# Patient Record
Sex: FEMALE | Race: WHITE | NOT HISPANIC OR LATINO | URBAN - METROPOLITAN AREA
[De-identification: names, ages, dates, MRNs, and addresses within clinical notes are randomized per-mention and may not be internally consistent; named-entity substitution may affect disease eponyms.]

---

## 2017-02-25 ENCOUNTER — EMERGENCY (EMERGENCY)
Facility: HOSPITAL | Age: 82
LOS: 1 days | Discharge: HOME | End: 2017-02-25
Attending: EMERGENCY MEDICINE | Admitting: EMERGENCY MEDICINE
Payer: MEDICARE

## 2017-02-25 VITALS
RESPIRATION RATE: 16 BRPM | DIASTOLIC BLOOD PRESSURE: 68 MMHG | TEMPERATURE: 98 F | HEART RATE: 73 BPM | SYSTOLIC BLOOD PRESSURE: 159 MMHG | OXYGEN SATURATION: 95 %

## 2017-02-25 VITALS
OXYGEN SATURATION: 98 % | DIASTOLIC BLOOD PRESSURE: 61 MMHG | RESPIRATION RATE: 16 BRPM | HEART RATE: 80 BPM | SYSTOLIC BLOOD PRESSURE: 166 MMHG

## 2017-02-25 DIAGNOSIS — Z85.828 PERSONAL HISTORY OF OTHER MALIGNANT NEOPLASM OF SKIN: Chronic | ICD-10-CM

## 2017-02-25 DIAGNOSIS — Z98.89 OTHER SPECIFIED POSTPROCEDURAL STATES: Chronic | ICD-10-CM

## 2017-02-25 LAB
ALBUMIN SERPL ELPH-MCNC: 4.3 G/DL — SIGNIFICANT CHANGE UP (ref 3.3–5)
ALP SERPL-CCNC: 103 U/L — SIGNIFICANT CHANGE UP (ref 40–120)
ALT FLD-CCNC: 16 U/L — SIGNIFICANT CHANGE UP (ref 4–33)
ANISOCYTOSIS BLD QL: SLIGHT — SIGNIFICANT CHANGE UP
APPEARANCE UR: CLEAR — SIGNIFICANT CHANGE UP
AST SERPL-CCNC: 19 U/L — SIGNIFICANT CHANGE UP (ref 4–32)
BASOPHILS # BLD AUTO: 0.01 K/UL — SIGNIFICANT CHANGE UP (ref 0–0.2)
BASOPHILS NFR BLD AUTO: 0.1 % — SIGNIFICANT CHANGE UP (ref 0–2)
BASOPHILS NFR SPEC: 0 % — SIGNIFICANT CHANGE UP (ref 0–2)
BILIRUB SERPL-MCNC: 0.4 MG/DL — SIGNIFICANT CHANGE UP (ref 0.2–1.2)
BILIRUB UR-MCNC: NEGATIVE — SIGNIFICANT CHANGE UP
BLASTS # FLD: 0 % — SIGNIFICANT CHANGE UP (ref 0–0)
BLOOD UR QL VISUAL: NEGATIVE — SIGNIFICANT CHANGE UP
BUN SERPL-MCNC: 14 MG/DL — SIGNIFICANT CHANGE UP (ref 7–23)
CALCIUM SERPL-MCNC: 9.6 MG/DL — SIGNIFICANT CHANGE UP (ref 8.4–10.5)
CHLORIDE SERPL-SCNC: 103 MMOL/L — SIGNIFICANT CHANGE UP (ref 98–107)
CO2 SERPL-SCNC: 27 MMOL/L — SIGNIFICANT CHANGE UP (ref 22–31)
COLOR SPEC: SIGNIFICANT CHANGE UP
CREAT SERPL-MCNC: 0.68 MG/DL — SIGNIFICANT CHANGE UP (ref 0.5–1.3)
EOSINOPHIL # BLD AUTO: 0.08 K/UL — SIGNIFICANT CHANGE UP (ref 0–0.5)
EOSINOPHIL NFR BLD AUTO: 0.9 % — SIGNIFICANT CHANGE UP (ref 0–6)
EOSINOPHIL NFR FLD: 1.8 % — SIGNIFICANT CHANGE UP (ref 0–6)
GLUCOSE SERPL-MCNC: 134 MG/DL — HIGH (ref 70–99)
GLUCOSE UR-MCNC: NEGATIVE — SIGNIFICANT CHANGE UP
HCT VFR BLD CALC: 41.4 % — SIGNIFICANT CHANGE UP (ref 34.5–45)
HGB BLD-MCNC: 13 G/DL — SIGNIFICANT CHANGE UP (ref 11.5–15.5)
IMM GRANULOCYTES NFR BLD AUTO: 0.4 % — SIGNIFICANT CHANGE UP (ref 0–1.5)
KETONES UR-MCNC: NEGATIVE — SIGNIFICANT CHANGE UP
LEUKOCYTE ESTERASE UR-ACNC: HIGH
LYMPHOCYTES # BLD AUTO: 1.1 K/UL — SIGNIFICANT CHANGE UP (ref 1–3.3)
LYMPHOCYTES # BLD AUTO: 12.4 % — LOW (ref 13–44)
LYMPHOCYTES NFR SPEC AUTO: 7.9 % — LOW (ref 13–44)
MCHC RBC-ENTMCNC: 26.5 PG — LOW (ref 27–34)
MCHC RBC-ENTMCNC: 31.4 % — LOW (ref 32–36)
MCV RBC AUTO: 84.5 FL — SIGNIFICANT CHANGE UP (ref 80–100)
METAMYELOCYTES # FLD: 0 % — SIGNIFICANT CHANGE UP (ref 0–1)
MONOCYTES # BLD AUTO: 0.68 K/UL — SIGNIFICANT CHANGE UP (ref 0–0.9)
MONOCYTES NFR BLD AUTO: 7.6 % — SIGNIFICANT CHANGE UP (ref 2–14)
MONOCYTES NFR BLD: 7.9 % — SIGNIFICANT CHANGE UP (ref 2–9)
MUCOUS THREADS # UR AUTO: SIGNIFICANT CHANGE UP
MYELOCYTES NFR BLD: 0 % — SIGNIFICANT CHANGE UP (ref 0–0)
NEUTROPHIL AB SER-ACNC: 82.5 % — HIGH (ref 43–77)
NEUTROPHILS # BLD AUTO: 6.98 K/UL — SIGNIFICANT CHANGE UP (ref 1.8–7.4)
NEUTROPHILS NFR BLD AUTO: 78.6 % — HIGH (ref 43–77)
NEUTS BAND # BLD: 0 % — SIGNIFICANT CHANGE UP (ref 0–6)
NITRITE UR-MCNC: NEGATIVE — SIGNIFICANT CHANGE UP
OTHER - HEMATOLOGY %: 0 — SIGNIFICANT CHANGE UP
PH UR: 7 — SIGNIFICANT CHANGE UP (ref 4.6–8)
PLATELET # BLD AUTO: 59 K/UL — LOW (ref 150–400)
PLATELET COUNT - ESTIMATE: SIGNIFICANT CHANGE UP
PMV BLD: 11.4 FL — SIGNIFICANT CHANGE UP (ref 7–13)
POIKILOCYTOSIS BLD QL AUTO: SLIGHT — SIGNIFICANT CHANGE UP
POTASSIUM SERPL-MCNC: 4.6 MMOL/L — SIGNIFICANT CHANGE UP (ref 3.5–5.3)
POTASSIUM SERPL-SCNC: 4.6 MMOL/L — SIGNIFICANT CHANGE UP (ref 3.5–5.3)
PROMYELOCYTES # FLD: 0 % — SIGNIFICANT CHANGE UP (ref 0–0)
PROT SERPL-MCNC: 7.2 G/DL — SIGNIFICANT CHANGE UP (ref 6–8.3)
PROT UR-MCNC: NEGATIVE — SIGNIFICANT CHANGE UP
RBC # BLD: 4.9 M/UL — SIGNIFICANT CHANGE UP (ref 3.8–5.2)
RBC # FLD: 15.4 % — HIGH (ref 10.3–14.5)
RBC CASTS # UR COMP ASSIST: SIGNIFICANT CHANGE UP (ref 0–?)
SODIUM SERPL-SCNC: 144 MMOL/L — SIGNIFICANT CHANGE UP (ref 135–145)
SP GR SPEC: 1.01 — SIGNIFICANT CHANGE UP (ref 1–1.03)
UROBILINOGEN FLD QL: NORMAL E.U. — SIGNIFICANT CHANGE UP (ref 0.1–0.2)
VARIANT LYMPHS # BLD: 0 % — SIGNIFICANT CHANGE UP
WBC # BLD: 8.89 K/UL — SIGNIFICANT CHANGE UP (ref 3.8–10.5)
WBC # FLD AUTO: 8.89 K/UL — SIGNIFICANT CHANGE UP (ref 3.8–10.5)
WBC UR QL: HIGH (ref 0–?)

## 2017-02-25 PROCEDURE — 73100 X-RAY EXAM OF WRIST: CPT | Mod: 26,LT

## 2017-02-25 PROCEDURE — 70450 CT HEAD/BRAIN W/O DYE: CPT | Mod: 26

## 2017-02-25 PROCEDURE — 99284 EMERGENCY DEPT VISIT MOD MDM: CPT | Mod: GC

## 2017-02-25 PROCEDURE — 73562 X-RAY EXAM OF KNEE 3: CPT | Mod: 26,50

## 2017-02-25 PROCEDURE — 71020: CPT | Mod: 26

## 2017-02-25 RX ORDER — ACETAMINOPHEN 500 MG
650 TABLET ORAL ONCE
Qty: 0 | Refills: 0 | Status: COMPLETED | OUTPATIENT
Start: 2017-02-25 | End: 2017-02-25

## 2017-02-25 RX ORDER — TETANUS TOXOID, REDUCED DIPHTHERIA TOXOID AND ACELLULAR PERTUSSIS VACCINE, ADSORBED 5; 2.5; 8; 8; 2.5 [IU]/.5ML; [IU]/.5ML; UG/.5ML; UG/.5ML; UG/.5ML
0.5 SUSPENSION INTRAMUSCULAR ONCE
Qty: 0 | Refills: 0 | Status: COMPLETED | OUTPATIENT
Start: 2017-02-25 | End: 2017-02-25

## 2017-02-25 RX ADMIN — TETANUS TOXOID, REDUCED DIPHTHERIA TOXOID AND ACELLULAR PERTUSSIS VACCINE, ADSORBED 0.5 MILLILITER(S): 5; 2.5; 8; 8; 2.5 SUSPENSION INTRAMUSCULAR at 17:33

## 2017-02-25 RX ADMIN — Medication 1 TABLET(S): at 19:27

## 2017-02-25 RX ADMIN — Medication 650 MILLIGRAM(S): at 18:30

## 2017-02-25 NOTE — ED PROVIDER NOTE - ATTENDING CONTRIBUTION TO CARE
Dr. Sanderson: I have personally seen and examined this patient at the bedside. I have fully participated in the care of this patient. I have reviewed all pertinent clinical information, including history, physical exam, plan and the Resident's note and agree except as noted. HPI above as by me. PE above as by me. Mechanical slip and fall stable vitals DDX traumatic subdural, rib fx, wrist injury PLAN tetanus, ct head, ct neck, cxr rib series, lt wrist and knee films, pain control. Discussed with patient admit of ct pos ich or multiple rib fractures, otherwise patient is well appearing, active and highly independent, in the absence of bleeding or multiple rib fractures patient could be dc home with pain control.

## 2017-02-25 NOTE — ED PROVIDER NOTE - OBJECTIVE STATEMENT
87yo F PMHx HTN, DM p/w CC rib pain, left wrist pain s/p fall - pt. explains she was walking down concrete stairs at her house when she slipped and fell, she hit left side of her head, denies LOC, not on blood thinners, remembers entire episode and was able to ambulate after fall - she is currently complaining of left wrist pain, B/L knee pain and diffuse B/L rib pain. Denies HA, visual changes, CP, SOB, LOC, N/V/D. 89yo F PMHx HTN, DM p/w CC rib pain, left wrist pain s/p fall - pt. explains she was walking down concrete stairs at her house when she slipped and fell, she hit left side of her head, denies LOC, not on blood thinners, remembers entire episode and was able to ambulate after fall - she is currently complaining of left wrist pain, B/L knee pain and diffuse B/L rib pain. Denies HA, visual changes, CP, SOB, LOC, N/V/D.    Kin att: Active 88yoF hx htn, dm s/p slip and fall c/o lue and left rib pain. 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 Denies loc, blood thinners, ha, blurry vision. Denies cp, palpitaitons, or sob before or after fall. Ambulatory since fall at home and in ED. At present reports pain to left forehead, left wrist, and left ribs. Pain in ribs worse with deep inspiration and direct palpation.

## 2017-02-25 NOTE — ED ADULT NURSE NOTE - OBJECTIVE STATEMENT
Received pt. in rm 11, A&Ox3, c/o trip and fall earlier today down the stairs. Pt. states she fell forwards; c/o b/l knee pain (abrasion to the left knee), left wrist and b/l ribs underneath breasts. Laceration to the left eyebrow. Denies LOC, dizziness, HA, n/v, cp or sob. h/o falls in the past. Ambulates at baseline without assistance. MD at bedside for eval. Will continue to monitor.

## 2017-02-25 NOTE — ED PROVIDER NOTE - MEDICAL DECISION MAKING DETAILS
89yo F PMHx HTN DM p/w CC knee, wrist, rib pain s/p fall will send for xray b/l knee, L wrist, CXR, CT head, basic labs, ua and reevaluate

## 2017-02-25 NOTE — ED ADULT TRIAGE NOTE - CHIEF COMPLAINT QUOTE
Hx of recent falls. Pt s/p trip and fall down 4-5 stairs c/o left sided rib and left wrist pain. Small lac noted to left eyebrow. pt denies blood thinners, LOC, chest pain, SOB.

## 2017-02-25 NOTE — ED PROVIDER NOTE - PHYSICAL EXAMINATION
Sanderson att: gcs 15, nad, aaox3, perrl, eomi, pos left frontal hematoma 1cm x 1cm with 5mm linear laceration, c-spine non tender, neck supple, neg spinal tenderness, ctabl, rrr, s1s2, abd soft ntnd, pos left ribs tender, lt wrist tender, lt knee tender, ambulatory neg assistance.

## 2017-02-25 NOTE — ED PROVIDER NOTE - FAMILY HISTORY
Mother  Still living? No  Family history of colon cancer, Age at diagnosis: Age Unknown     Father  Still living? No  Family history of lung cancer, Age at diagnosis: Age Unknown

## 2017-02-25 NOTE — ED PROVIDER NOTE - PROGRESS NOTE DETAILS
SALOME EP: 89 y/o female with hx of HTN and DM here after fall. PAtient reports a mechanical fall and landing on her L side on her chest, and knee and LUE. Patient denies LOC, HA, vomiting, visual changes, ataxia, recent fever, recent abd pain, recent N/V/D, recent urinary symptoms, or being on anti-coagulation. Consider mechanical fall and will eval for UTI. Plan Xray of L hand, CT head, Adacel, Pain control and UA and reassess

## 2018-05-18 NOTE — ED ADULT NURSE NOTE - FINAL NURSING ELECTRONIC SIGNATURE
STOP THE BLOOD PRESSURE MEDICATION ON Tuesday AND PT IS STATING THAT HE'S BREATHING IS  BETTER WITH OUT IT   SO PLEASE SEND NEW RX TO PHARMACY [facility] [place] 25-Feb-2017 19:42

## 2018-06-25 ENCOUNTER — LABORATORY RESULT (OUTPATIENT)
Age: 83
End: 2018-06-25

## 2018-06-25 ENCOUNTER — APPOINTMENT (OUTPATIENT)
Dept: HEMATOLOGY ONCOLOGY | Facility: CLINIC | Age: 83
End: 2018-06-25

## 2018-06-25 VITALS
HEIGHT: 61 IN | SYSTOLIC BLOOD PRESSURE: 153 MMHG | HEART RATE: 87 BPM | WEIGHT: 129 LBS | TEMPERATURE: 97.6 F | DIASTOLIC BLOOD PRESSURE: 74 MMHG | BODY MASS INDEX: 24.35 KG/M2

## 2018-06-25 DIAGNOSIS — Z87.19 PERSONAL HISTORY OF OTHER DISEASES OF THE DIGESTIVE SYSTEM: ICD-10-CM

## 2018-06-25 DIAGNOSIS — E11.9 TYPE 2 DIABETES MELLITUS W/OUT COMPLICATIONS: ICD-10-CM

## 2018-06-25 DIAGNOSIS — Z78.9 OTHER SPECIFIED HEALTH STATUS: ICD-10-CM

## 2018-06-25 LAB
HCT VFR BLD CALC: 31.1 %
HGB BLD-MCNC: 9.4 G/DL
IRON SATN MFR SERPL: 11 %
IRON SERPL-MCNC: 34 UG/DL
MCHC RBC-ENTMCNC: 26.3 PG
MCHC RBC-ENTMCNC: 30.2 G/DL
MCV RBC AUTO: 86.9 FL
PLATELET # BLD AUTO: 208 K/UL
PMV BLD: 10.8 FL
RBC # BLD: 3.58 M/UL
RBC # FLD: 17.2 %
RETICS # AUTO: 2.4 %
RETICS AGGREG/RBC NFR: 84.8 K/UL
TIBC SERPL-MCNC: 312 UG/DL
UIBC SERPL-MCNC: 278 UG/DL
WBC # FLD AUTO: 7.6 K/UL

## 2018-06-27 LAB
DEPRECATED KAPPA LC FREE/LAMBDA SER: 0.54 RATIO
FERRITIN SERPL-MCNC: 12 NG/ML
KAPPA LC CSF-MCNC: 2.07 MG/DL
KAPPA LC SERPL-MCNC: 1.11 MG/DL
TSH SERPL-ACNC: 4.37 UIU/ML
VIT B12 SERPL-MCNC: 282 PG/ML

## 2018-06-29 ENCOUNTER — APPOINTMENT (OUTPATIENT)
Dept: INFUSION THERAPY | Facility: CLINIC | Age: 83
End: 2018-06-29

## 2018-06-29 RX ORDER — IRON SUCROSE 20 MG/ML
200 INJECTION, SOLUTION INTRAVENOUS ONCE
Qty: 0 | Refills: 0 | Status: COMPLETED | OUTPATIENT
Start: 2018-06-29 | End: 2018-06-29

## 2018-06-29 RX ORDER — DIPHENHYDRAMINE HCL 50 MG
25 CAPSULE ORAL ONCE
Qty: 0 | Refills: 0 | Status: COMPLETED | OUTPATIENT
Start: 2018-06-29 | End: 2018-06-29

## 2018-06-29 RX ADMIN — Medication 25 MILLIGRAM(S): at 16:34

## 2018-06-29 RX ADMIN — IRON SUCROSE 220 MILLIGRAM(S): 20 INJECTION, SOLUTION INTRAVENOUS at 15:05

## 2018-07-06 ENCOUNTER — APPOINTMENT (OUTPATIENT)
Dept: INFUSION THERAPY | Facility: CLINIC | Age: 83
End: 2018-07-06

## 2018-07-06 RX ORDER — DIPHENHYDRAMINE HCL 50 MG
25 CAPSULE ORAL ONCE
Qty: 0 | Refills: 0 | Status: COMPLETED | OUTPATIENT
Start: 2018-07-06 | End: 2018-07-06

## 2018-07-06 RX ORDER — IRON SUCROSE 20 MG/ML
200 INJECTION, SOLUTION INTRAVENOUS ONCE
Qty: 0 | Refills: 0 | Status: COMPLETED | OUTPATIENT
Start: 2018-07-06 | End: 2018-07-06

## 2018-07-06 RX ADMIN — IRON SUCROSE 220 MILLIGRAM(S): 20 INJECTION, SOLUTION INTRAVENOUS at 15:13

## 2018-07-06 RX ADMIN — Medication 151.5 MILLIGRAM(S): at 15:13

## 2018-07-13 ENCOUNTER — APPOINTMENT (OUTPATIENT)
Dept: INFUSION THERAPY | Facility: CLINIC | Age: 83
End: 2018-07-13

## 2018-07-13 VITALS
HEART RATE: 80 BPM | TEMPERATURE: 96.3 F | DIASTOLIC BLOOD PRESSURE: 62 MMHG | SYSTOLIC BLOOD PRESSURE: 144 MMHG | RESPIRATION RATE: 18 BRPM

## 2018-07-13 RX ORDER — IRON SUCROSE 20 MG/ML
200 INJECTION, SOLUTION INTRAVENOUS ONCE
Qty: 0 | Refills: 0 | Status: COMPLETED | OUTPATIENT
Start: 2018-07-13 | End: 2018-07-13

## 2018-07-13 RX ORDER — DIPHENHYDRAMINE HCL 50 MG
25 CAPSULE ORAL ONCE
Qty: 0 | Refills: 0 | Status: COMPLETED | OUTPATIENT
Start: 2018-07-13 | End: 2018-07-13

## 2018-07-13 RX ADMIN — Medication 151.5 MILLIGRAM(S): at 15:15

## 2018-07-13 RX ADMIN — IRON SUCROSE 220 MILLIGRAM(S): 20 INJECTION, SOLUTION INTRAVENOUS at 15:15

## 2018-07-20 ENCOUNTER — APPOINTMENT (OUTPATIENT)
Dept: INFUSION THERAPY | Facility: CLINIC | Age: 83
End: 2018-07-20

## 2018-07-20 RX ORDER — IRON SUCROSE 20 MG/ML
200 INJECTION, SOLUTION INTRAVENOUS ONCE
Qty: 0 | Refills: 0 | Status: COMPLETED | OUTPATIENT
Start: 2018-07-20 | End: 2018-07-20

## 2018-07-20 RX ORDER — DIPHENHYDRAMINE HCL 50 MG
25 CAPSULE ORAL ONCE
Qty: 0 | Refills: 0 | Status: COMPLETED | OUTPATIENT
Start: 2018-07-20 | End: 2018-07-20

## 2018-07-20 RX ADMIN — Medication 151.5 MILLIGRAM(S): at 14:48

## 2018-07-20 RX ADMIN — IRON SUCROSE 220 MILLIGRAM(S): 20 INJECTION, SOLUTION INTRAVENOUS at 14:48

## 2018-07-27 ENCOUNTER — APPOINTMENT (OUTPATIENT)
Dept: INFUSION THERAPY | Facility: CLINIC | Age: 83
End: 2018-07-27

## 2018-07-27 RX ORDER — DIPHENHYDRAMINE HCL 50 MG
25 CAPSULE ORAL ONCE
Qty: 0 | Refills: 0 | Status: COMPLETED | OUTPATIENT
Start: 2018-07-27 | End: 2018-07-27

## 2018-07-27 RX ORDER — IRON SUCROSE 20 MG/ML
200 INJECTION, SOLUTION INTRAVENOUS ONCE
Qty: 0 | Refills: 0 | Status: COMPLETED | OUTPATIENT
Start: 2018-07-27 | End: 2018-07-27

## 2018-07-27 RX ADMIN — IRON SUCROSE 220 MILLIGRAM(S): 20 INJECTION, SOLUTION INTRAVENOUS at 14:48

## 2018-07-27 RX ADMIN — Medication 151.5 MILLIGRAM(S): at 14:48

## 2018-08-03 ENCOUNTER — APPOINTMENT (OUTPATIENT)
Dept: HEMATOLOGY ONCOLOGY | Facility: CLINIC | Age: 83
End: 2018-08-03

## 2018-08-03 ENCOUNTER — LABORATORY RESULT (OUTPATIENT)
Age: 83
End: 2018-08-03

## 2018-08-06 LAB
FERRITIN SERPL-MCNC: 279 NG/ML
HCT VFR BLD CALC: 36.8 %
HGB BLD-MCNC: 11.6 G/DL
IRON SATN MFR SERPL: 25 %
IRON SERPL-MCNC: 71 UG/DL
MCHC RBC-ENTMCNC: 27.8 PG
MCHC RBC-ENTMCNC: 31.5 G/DL
MCV RBC AUTO: 88.2 FL
PLATELET # BLD AUTO: 152 K/UL
PMV BLD: 10.4 FL
RBC # BLD: 4.17 M/UL
RBC # FLD: 17.8 %
TIBC SERPL-MCNC: 286 UG/DL
UIBC SERPL-MCNC: 215 UG/DL
WBC # FLD AUTO: 7.51 K/UL

## 2018-08-16 ENCOUNTER — APPOINTMENT (OUTPATIENT)
Dept: HEMATOLOGY ONCOLOGY | Facility: CLINIC | Age: 83
End: 2018-08-16

## 2018-08-16 VITALS
HEART RATE: 79 BPM | HEIGHT: 61 IN | BODY MASS INDEX: 24.17 KG/M2 | RESPIRATION RATE: 18 BRPM | TEMPERATURE: 96 F | SYSTOLIC BLOOD PRESSURE: 144 MMHG | DIASTOLIC BLOOD PRESSURE: 80 MMHG | WEIGHT: 128 LBS

## 2018-09-17 ENCOUNTER — APPOINTMENT (OUTPATIENT)
Dept: HEMATOLOGY ONCOLOGY | Facility: CLINIC | Age: 83
End: 2018-09-17

## 2018-09-17 ENCOUNTER — OUTPATIENT (OUTPATIENT)
Dept: OUTPATIENT SERVICES | Facility: HOSPITAL | Age: 83
LOS: 1 days | Discharge: HOME | End: 2018-09-17

## 2018-09-17 VITALS
RESPIRATION RATE: 18 BRPM | HEIGHT: 61 IN | SYSTOLIC BLOOD PRESSURE: 122 MMHG | DIASTOLIC BLOOD PRESSURE: 76 MMHG | BODY MASS INDEX: 24.17 KG/M2 | WEIGHT: 128 LBS | HEART RATE: 90 BPM | TEMPERATURE: 97.1 F

## 2018-09-17 DIAGNOSIS — Z85.828 PERSONAL HISTORY OF OTHER MALIGNANT NEOPLASM OF SKIN: Chronic | ICD-10-CM

## 2018-09-17 DIAGNOSIS — M31.6 OTHER GIANT CELL ARTERITIS: ICD-10-CM

## 2018-09-17 DIAGNOSIS — D47.2 MONOCLONAL GAMMOPATHY: ICD-10-CM

## 2018-09-17 DIAGNOSIS — Z98.89 OTHER SPECIFIED POSTPROCEDURAL STATES: Chronic | ICD-10-CM

## 2018-09-18 DIAGNOSIS — D50.9 IRON DEFICIENCY ANEMIA, UNSPECIFIED: ICD-10-CM

## 2018-12-17 ENCOUNTER — APPOINTMENT (OUTPATIENT)
Dept: HEMATOLOGY ONCOLOGY | Facility: CLINIC | Age: 83
End: 2018-12-17

## 2018-12-17 ENCOUNTER — OUTPATIENT (OUTPATIENT)
Dept: OUTPATIENT SERVICES | Facility: HOSPITAL | Age: 83
LOS: 1 days | Discharge: HOME | End: 2018-12-17

## 2018-12-17 ENCOUNTER — LABORATORY RESULT (OUTPATIENT)
Age: 83
End: 2018-12-17

## 2018-12-17 VITALS
SYSTOLIC BLOOD PRESSURE: 165 MMHG | DIASTOLIC BLOOD PRESSURE: 80 MMHG | WEIGHT: 126 LBS | HEART RATE: 84 BPM | RESPIRATION RATE: 18 BRPM | BODY MASS INDEX: 23.79 KG/M2 | HEIGHT: 61 IN

## 2018-12-17 DIAGNOSIS — Z98.89 OTHER SPECIFIED POSTPROCEDURAL STATES: Chronic | ICD-10-CM

## 2018-12-17 DIAGNOSIS — Z00.00 ENCOUNTER FOR GENERAL ADULT MEDICAL EXAMINATION W/OUT ABNORMAL FINDINGS: ICD-10-CM

## 2018-12-17 DIAGNOSIS — Z85.828 PERSONAL HISTORY OF OTHER MALIGNANT NEOPLASM OF SKIN: Chronic | ICD-10-CM

## 2018-12-17 LAB
HCT VFR BLD CALC: 35.9 %
HGB BLD-MCNC: 11.7 G/DL
MCHC RBC-ENTMCNC: 28.1 PG
MCHC RBC-ENTMCNC: 32.6 G/DL
MCV RBC AUTO: 86.3 FL
PLATELET # BLD AUTO: 184 K/UL
PMV BLD: 10.7 FL
RBC # BLD: 4.16 M/UL
RBC # FLD: 14.9 %
WBC # FLD AUTO: 5.77 K/UL

## 2018-12-17 NOTE — ASSESSMENT
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 with history of melena on ASA , past history of bleeding ulcers ? Iron deficiency anemia responding to therapy , Hb is 11.7 and stable , will check ferritin . follow up in 3 months . \par 2) Nephrolithiasis. followed by urology . \par

## 2018-12-17 NOTE — HISTORY OF PRESENT ILLNESS
[de-identified] : 89 year old white female referred by Dr Mayo for anemia. PMH significant for GERD , GI bleeding ( probably bleeding ulcers? ),  she was in her usual state of health until 6 months ago when she started with complaints of muscle pain , stiffness, jaw claudication, She asked to see rheumatology after reading an article in local newspaper on polymyalgia rheumatica. she was found with markedly elevated CRP ( 57 ) , ESR : 96 Hb : 10.2 ,MCV : 80 , significant platelet clumping , positive CCP igG :94 , negative RF , faint M spike, Temporal artey biopsy was allegedly inconclusive and was started nonetheless on a course of prednisone with improvement in her symptoms and normalization of blood work including ESR , CCP , CRP , immunofixation ,  except for  Hb which was further decreased to 9.6 on May 29 . She complains of mild generalized weakness and noted melanotic stools for several days when she started on ASA . Since then she reduced to one every 3 days. She is currently on prednisone 15mg daily , PPI every 2 days. She is intolerant to po iron . She denies weight loss, visual symptoms. She is fairly active , recently won senior [employer] championship. 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  [de-identified] : 08/16/2018 : Patient returns for follow up , she feels better after receiving venofer for iron deficiency likely secondary to GI bleeding . she resumed aspirin and takes half baby aspirin every 3 to 4 days . She reports occasional melena. She notes mild pedal edema for which she was prescribed lasix . last blood work showed Hb : 11.6 , ferritin : 279 \par \par 12/17/2018 Patient returns for follow up for iron deficiency from suspected GI bleed . She feels well , she denies any fatigue or increased weakness from baseline , no craving for ice or hematochezia she continues on prednisone 7.5 mg for Temporal Arteritis. She was also diagnosed with left nephro-ureterolithiasis and recommended observation for now , she denies flank pain or hematuria . serum iron or ferritin was allegedly 9 two weeks ago .

## 2018-12-17 NOTE — PHYSICAL EXAM
[Normal] : normoactive bowel sounds, soft and nontender, no hepatosplenomegaly or masses appreciated [de-identified] : well preserved , pale in no acute distress.

## 2018-12-18 LAB — FERRITIN SERPL-MCNC: 73 NG/ML

## 2018-12-19 DIAGNOSIS — D50.9 IRON DEFICIENCY ANEMIA, UNSPECIFIED: ICD-10-CM

## 2019-03-25 ENCOUNTER — LABORATORY RESULT (OUTPATIENT)
Age: 84
End: 2019-03-25

## 2019-03-25 ENCOUNTER — APPOINTMENT (OUTPATIENT)
Dept: HEMATOLOGY ONCOLOGY | Facility: CLINIC | Age: 84
End: 2019-03-25

## 2019-03-25 ENCOUNTER — OUTPATIENT (OUTPATIENT)
Dept: OUTPATIENT SERVICES | Facility: HOSPITAL | Age: 84
LOS: 1 days | Discharge: HOME | End: 2019-03-25

## 2019-03-25 VITALS
BODY MASS INDEX: 25.11 KG/M2 | HEIGHT: 61 IN | DIASTOLIC BLOOD PRESSURE: 76 MMHG | WEIGHT: 133 LBS | HEART RATE: 83 BPM | SYSTOLIC BLOOD PRESSURE: 154 MMHG | RESPIRATION RATE: 14 BRPM | TEMPERATURE: 98.2 F

## 2019-03-25 DIAGNOSIS — Z98.89 OTHER SPECIFIED POSTPROCEDURAL STATES: Chronic | ICD-10-CM

## 2019-03-25 DIAGNOSIS — Z85.828 PERSONAL HISTORY OF OTHER MALIGNANT NEOPLASM OF SKIN: Chronic | ICD-10-CM

## 2019-03-25 LAB
HCT VFR BLD CALC: 37.5 %
HGB BLD-MCNC: 12.1 G/DL
MCHC RBC-ENTMCNC: 27.9 PG
MCHC RBC-ENTMCNC: 32.3 G/DL
MCV RBC AUTO: 86.6 FL
PLATELET # BLD AUTO: 110 K/UL
PMV BLD: 12.8 FL
RBC # BLD: 4.33 M/UL
RBC # FLD: 14.7 %
WBC # FLD AUTO: 5.42 K/UL

## 2019-03-25 NOTE — HISTORY OF PRESENT ILLNESS
[de-identified] : 89 year old white female referred by Dr Mayo for anemia. PMH significant for GERD , GI bleeding ( probably bleeding ulcers? ),  she was in her usual state of health until 6 months ago when she started with complaints of muscle pain , stiffness, jaw claudication, She asked to see rheumatology after reading an article in local newspaper on polymyalgia rheumatica. she was found with markedly elevated CRP ( 57 ) , ESR : 96 Hb : 10.2 ,MCV : 80 , significant platelet clumping , positive CCP igG :94 , negative RF , faint M spike, Temporal artey biopsy was allegedly inconclusive and was started nonetheless on a course of prednisone with improvement in her symptoms and normalization of blood work including ESR , CCP , CRP , immunofixation ,  except for  Hb which was further decreased to 9.6 on May 29 . She complains of mild generalized weakness and noted melanotic stools for several days when she started on ASA . Since then she reduced to one every 3 days. She is currently on prednisone 15mg daily , PPI every 2 days. She is intolerant to po iron . She denies weight loss, visual symptoms. She is fairly active , recently won senior [employer] championship. 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  [de-identified] : 08/16/2018 : Patient returns for follow up , she feels better after receiving venofer for iron deficiency likely secondary to GI bleeding . she resumed aspirin and takes half baby aspirin every 3 to 4 days . She reports occasional melena. She notes mild pedal edema for which she was prescribed lasix . last blood work showed Hb : 11.6 , ferritin : 279 \par \par 12/17/2018 Patient returns for follow up for iron deficiency from suspected GI bleed . She feels well , she denies any fatigue or increased weakness from baseline , no craving for ice or hematochezia she continues on prednisone 7.5 mg for Temporal Arteritis. She was also diagnosed with left nephro-ureterolithiasis and recommended observation for now , she denies flank pain or hematuria . serum iron or ferritin was allegedly 9 two weeks ago . \par \par 03/25/2019 Patient returns for follow up , she feels well , denies any weakness, SOB or dizziness. She is now on prednisone 5mg for Temporal arteritis , symptoms of jaw discomfort and headaches have resolved . She is not on po iron ( intolerant )  , last ferritin was 73 in December .

## 2019-03-25 NOTE — PHYSICAL EXAM
[Normal] : normoactive bowel sounds, soft and nontender, no hepatosplenomegaly or masses appreciated [de-identified] : well preserved , pale in no acute distress.

## 2019-03-28 DIAGNOSIS — D50.9 IRON DEFICIENCY ANEMIA, UNSPECIFIED: ICD-10-CM

## 2019-09-30 ENCOUNTER — LABORATORY RESULT (OUTPATIENT)
Age: 84
End: 2019-09-30

## 2019-09-30 ENCOUNTER — OUTPATIENT (OUTPATIENT)
Dept: OUTPATIENT SERVICES | Facility: HOSPITAL | Age: 84
LOS: 1 days | Discharge: HOME | End: 2019-09-30

## 2019-09-30 ENCOUNTER — APPOINTMENT (OUTPATIENT)
Dept: HEMATOLOGY ONCOLOGY | Facility: CLINIC | Age: 84
End: 2019-09-30
Payer: MEDICARE

## 2019-09-30 VITALS
BODY MASS INDEX: 26.43 KG/M2 | SYSTOLIC BLOOD PRESSURE: 143 MMHG | HEART RATE: 86 BPM | TEMPERATURE: 96.5 F | WEIGHT: 140 LBS | DIASTOLIC BLOOD PRESSURE: 65 MMHG | RESPIRATION RATE: 14 BRPM | HEIGHT: 61 IN

## 2019-09-30 DIAGNOSIS — D64.9 ANEMIA, UNSPECIFIED: ICD-10-CM

## 2019-09-30 DIAGNOSIS — Z98.89 OTHER SPECIFIED POSTPROCEDURAL STATES: Chronic | ICD-10-CM

## 2019-09-30 DIAGNOSIS — Z85.828 PERSONAL HISTORY OF OTHER MALIGNANT NEOPLASM OF SKIN: Chronic | ICD-10-CM

## 2019-09-30 LAB
HCT VFR BLD CALC: 37.1 %
HGB BLD-MCNC: 11.8 G/DL
MCHC RBC-ENTMCNC: 27.4 PG
MCHC RBC-ENTMCNC: 31.8 G/DL
MCV RBC AUTO: 86.1 FL
PLATELET # BLD AUTO: 173 K/UL
PMV BLD: 10.4 FL
RBC # BLD: 4.31 M/UL
RBC # FLD: 14.1 %
WBC # FLD AUTO: 4.49 K/UL

## 2019-09-30 PROCEDURE — 99213 OFFICE O/P EST LOW 20 MIN: CPT

## 2019-10-01 LAB — FERRITIN SERPL-MCNC: 32 NG/ML

## 2019-10-01 NOTE — HISTORY OF PRESENT ILLNESS
[de-identified] : 89 year old white female referred by Dr Mayo for anemia. PMH significant for GERD , GI bleeding ( probably bleeding ulcers? ),  she was in her usual state of health until 6 months ago when she started with complaints of muscle pain , stiffness, jaw claudication, She asked to see rheumatology after reading an article in local newspaper on polymyalgia rheumatica. she was found with markedly elevated CRP ( 57 ) , ESR : 96 Hb : 10.2 ,MCV : 80 , significant platelet clumping , positive CCP igG :94 , negative RF , faint M spike, Temporal artey biopsy was allegedly inconclusive and was started nonetheless on a course of prednisone with improvement in her symptoms and normalization of blood work including ESR , CCP , CRP , immunofixation ,  except for  Hb which was further decreased to 9.6 on May 29 . She complains of mild generalized weakness and noted melanotic stools for several days when she started on ASA . Since then she reduced to one every 3 days. She is currently on prednisone 15mg daily , PPI every 2 days. She is intolerant to po iron . She denies weight loss, visual symptoms. She is fairly active , recently won senior [employer] championship. 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  [de-identified] : 08/16/2018 : Patient returns for follow up , she feels better after receiving venofer for iron deficiency likely secondary to GI bleeding . she resumed aspirin and takes half baby aspirin every 3 to 4 days . She reports occasional melena. She notes mild pedal edema for which she was prescribed lasix . last blood work showed Hb : 11.6 , ferritin : 279 \par \par 12/17/2018 Patient returns for follow up for iron deficiency from suspected GI bleed . She feels well , she denies any fatigue or increased weakness from baseline , no craving for ice or hematochezia she continues on prednisone 7.5 mg for Temporal Arteritis. She was also diagnosed with left nephro-ureterolithiasis and recommended observation for now , she denies flank pain or hematuria . serum iron or ferritin was allegedly 9 two weeks ago . \par \par 03/25/2019 Patient returns for follow up , she feels well , denies any weakness, SOB or dizziness. She is now on prednisone 5mg for Temporal arteritis , symptoms of jaw discomfort and headaches have resolved . She is not on po iron ( intolerant )  , last ferritin was 73 in December . \par \par 9/30/19:  Patient returns for follow up , she feels well , denies any weakness, SOB or dizziness. Her Hb is down to 11.8 today. She is off prednisone now. SPO2 was 96%.

## 2019-10-01 NOTE — ASSESSMENT
[FreeTextEntry1] : 89 year well preserved female with  temporal arteritis responded to prednisone. She has  history of melena on ASA , past history of bleeding ulcers ? Iron deficiency anemia responded to therapy , Hb is down to 11.8 today.\par \par Will repeat ferritin. May have to schedule the pt for IV iron as she was intolerant to PO iron in the past.\par \par RTO in 4 months

## 2019-10-01 NOTE — PHYSICAL EXAM
[Ambulatory and capable of all self care but unable to carry out any work activities] : Status 2- Ambulatory and capable of all self care but unable to carry out any work activities. Up and about more than 50% of waking hours [Normal] : no peripheral adenopathy appreciated [de-identified] : well preserved , pale in no acute distress.

## 2019-10-08 DIAGNOSIS — D64.9 ANEMIA, UNSPECIFIED: ICD-10-CM

## 2020-01-27 ENCOUNTER — APPOINTMENT (OUTPATIENT)
Dept: HEMATOLOGY ONCOLOGY | Facility: CLINIC | Age: 85
End: 2020-01-27

## 2021-05-10 ENCOUNTER — NON-APPOINTMENT (OUTPATIENT)
Age: 86
End: 2021-05-10

## 2021-05-14 ENCOUNTER — OUTPATIENT (OUTPATIENT)
Dept: OUTPATIENT SERVICES | Facility: HOSPITAL | Age: 86
LOS: 1 days | Discharge: HOME | End: 2021-05-14

## 2021-05-14 ENCOUNTER — APPOINTMENT (OUTPATIENT)
Dept: NUTRITION | Facility: CLINIC | Age: 86
End: 2021-05-14

## 2021-05-14 DIAGNOSIS — Z98.89 OTHER SPECIFIED POSTPROCEDURAL STATES: Chronic | ICD-10-CM

## 2021-05-14 DIAGNOSIS — Z85.828 PERSONAL HISTORY OF OTHER MALIGNANT NEOPLASM OF SKIN: Chronic | ICD-10-CM

## 2021-05-20 ENCOUNTER — NON-APPOINTMENT (OUTPATIENT)
Age: 86
End: 2021-05-20

## 2021-05-21 ENCOUNTER — OUTPATIENT (OUTPATIENT)
Dept: OUTPATIENT SERVICES | Facility: HOSPITAL | Age: 86
LOS: 1 days | Discharge: HOME | End: 2021-05-21

## 2021-05-21 ENCOUNTER — APPOINTMENT (OUTPATIENT)
Dept: NUTRITION | Facility: CLINIC | Age: 86
End: 2021-05-21

## 2021-05-21 DIAGNOSIS — Z85.828 PERSONAL HISTORY OF OTHER MALIGNANT NEOPLASM OF SKIN: Chronic | ICD-10-CM

## 2021-05-21 DIAGNOSIS — Z98.89 OTHER SPECIFIED POSTPROCEDURAL STATES: Chronic | ICD-10-CM

## 2021-05-21 DIAGNOSIS — E11.9 TYPE 2 DIABETES MELLITUS WITHOUT COMPLICATIONS: ICD-10-CM

## 2021-06-14 ENCOUNTER — APPOINTMENT (OUTPATIENT)
Dept: UROLOGY | Facility: CLINIC | Age: 86
End: 2021-06-14
Payer: MEDICARE

## 2021-06-14 ENCOUNTER — LABORATORY RESULT (OUTPATIENT)
Age: 86
End: 2021-06-14

## 2021-06-14 DIAGNOSIS — Z87.442 PERSONAL HISTORY OF URINARY CALCULI: ICD-10-CM

## 2021-06-14 DIAGNOSIS — N39.41 URGE INCONTINENCE: ICD-10-CM

## 2021-06-14 DIAGNOSIS — R32 UNSPECIFIED URINARY INCONTINENCE: ICD-10-CM

## 2021-06-14 DIAGNOSIS — N39.3 STRESS INCONTINENCE (FEMALE) (MALE): ICD-10-CM

## 2021-06-14 PROCEDURE — 99204 OFFICE O/P NEW MOD 45 MIN: CPT

## 2021-06-14 RX ORDER — ATORVASTATIN CALCIUM 10 MG/1
10 TABLET, FILM COATED ORAL
Qty: 90 | Refills: 0 | Status: ACTIVE | COMMUNITY
Start: 2020-05-27

## 2021-06-14 RX ORDER — GALANTAMINE 4 MG/1
4 TABLET, FILM COATED ORAL
Qty: 30 | Refills: 0 | Status: ACTIVE | COMMUNITY
Start: 2021-03-10

## 2021-06-14 RX ORDER — BLOOD SUGAR DIAGNOSTIC
STRIP MISCELLANEOUS
Qty: 100 | Refills: 0 | Status: ACTIVE | COMMUNITY
Start: 2021-05-07

## 2021-06-14 RX ORDER — LEVOTHYROXINE SODIUM 0.1 MG/1
100 TABLET ORAL
Qty: 90 | Refills: 0 | Status: ACTIVE | COMMUNITY
Start: 2021-05-07

## 2021-06-14 RX ORDER — PEN NEEDLE, DIABETIC 29 G X1/2"
31G X 5 MM NEEDLE, DISPOSABLE MISCELLANEOUS
Qty: 100 | Refills: 0 | Status: ACTIVE | COMMUNITY
Start: 2021-05-07

## 2021-06-14 RX ORDER — ATORVASTATIN CALCIUM 20 MG/1
20 TABLET, FILM COATED ORAL
Qty: 90 | Refills: 0 | Status: ACTIVE | COMMUNITY
Start: 2021-05-07

## 2021-06-14 RX ORDER — LEVOTHYROXINE SODIUM 0.09 MG/1
88 TABLET ORAL
Qty: 90 | Refills: 0 | Status: ACTIVE | COMMUNITY
Start: 2021-02-11

## 2021-06-14 RX ORDER — FLUOXETINE HYDROCHLORIDE 10 MG/1
10 CAPSULE ORAL
Qty: 30 | Refills: 0 | Status: ACTIVE | COMMUNITY
Start: 2021-01-07

## 2021-06-14 RX ORDER — LORAZEPAM 0.5 MG/1
0.5 TABLET ORAL
Qty: 30 | Refills: 0 | Status: ACTIVE | COMMUNITY
Start: 2021-02-17

## 2021-06-14 RX ORDER — SITAGLIPTIN 50 MG/1
50 TABLET, FILM COATED ORAL
Qty: 30 | Refills: 0 | Status: ACTIVE | COMMUNITY
Start: 2021-04-07

## 2021-06-14 RX ORDER — INSULIN GLARGINE 100 [IU]/ML
100 INJECTION, SOLUTION SUBCUTANEOUS
Qty: 9 | Refills: 0 | Status: ACTIVE | COMMUNITY
Start: 2021-05-07

## 2021-06-14 NOTE — HISTORY OF PRESENT ILLNESS
[FreeTextEntry1] : YAIR PEARL is a 89 year old female who presents for consultation for urinary incontinence.\par \par Pt reports she has been experiencing incontinence for quite some time now. \par Pt reports using 3-4 ppd. \par Pt reports she has recently been diagnosed with DM type 2. \par Pt reports no vaginal bulging at this time. \par Pt reports she tried Myrbetriq with no improvement on the Sx. prev seen by dr montoya\par \par Pt reports she experienced an episode of gross hematuria about 2 years ago and the PCP did not find the etiology of the gross hematuria. Pt was seen by a Urologist after the episode of gross hematuria, and recalls cystoscopy was performed. \par Denies gross hematuria, dysuria or associated symptoms at this time. 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 \par

## 2021-06-14 NOTE — ADDENDUM
[FreeTextEntry1] : Patient's note was transcribed with the assistance of a medical scribe under the supervision of Dr. Cueva.\par I, Dr. Cueva, have reviewed the patient's chart and agree that it aligns with my medical decisions.\par Stephanie Nuñez, our scribe, also served as a chaperone for physical examination purposes.\par \par \par

## 2021-06-14 NOTE — ASSESSMENT
[FreeTextEntry1] : YAIR PEARL is a 89 year old female who presents for consultation for mixed JYOTI and UUI, hx nephrolithiaisis and prior gross hematuria w/u approximately 2 years ago.\par \par Plan: \par Kegel Exercises \par RBUS \par UA uCx ucytol\par fu after consider dr major referral\par \par

## 2021-06-16 LAB — URINE CYTOLOGY: NORMAL

## 2021-06-22 RX ORDER — CEFPODOXIME PROXETIL 200 MG/1
200 TABLET, FILM COATED ORAL
Qty: 14 | Refills: 0 | Status: ACTIVE | COMMUNITY
Start: 2021-06-22 | End: 1900-01-01

## 2021-07-21 ENCOUNTER — APPOINTMENT (OUTPATIENT)
Dept: NUTRITION | Facility: CLINIC | Age: 86
End: 2021-07-21

## 2021-07-24 ENCOUNTER — EMERGENCY (EMERGENCY)
Facility: HOSPITAL | Age: 86
LOS: 0 days | Discharge: HOME | End: 2021-07-24
Attending: EMERGENCY MEDICINE | Admitting: EMERGENCY MEDICINE
Payer: MEDICARE

## 2021-07-24 VITALS
TEMPERATURE: 99 F | DIASTOLIC BLOOD PRESSURE: 89 MMHG | RESPIRATION RATE: 18 BRPM | HEART RATE: 86 BPM | HEIGHT: 62 IN | OXYGEN SATURATION: 99 % | SYSTOLIC BLOOD PRESSURE: 205 MMHG

## 2021-07-24 VITALS
RESPIRATION RATE: 18 BRPM | TEMPERATURE: 98 F | HEART RATE: 84 BPM | DIASTOLIC BLOOD PRESSURE: 84 MMHG | OXYGEN SATURATION: 98 % | SYSTOLIC BLOOD PRESSURE: 168 MMHG

## 2021-07-24 DIAGNOSIS — Z20.822 CONTACT WITH AND (SUSPECTED) EXPOSURE TO COVID-19: ICD-10-CM

## 2021-07-24 DIAGNOSIS — Z79.899 OTHER LONG TERM (CURRENT) DRUG THERAPY: ICD-10-CM

## 2021-07-24 DIAGNOSIS — R11.2 NAUSEA WITH VOMITING, UNSPECIFIED: ICD-10-CM

## 2021-07-24 DIAGNOSIS — Z88.1 ALLERGY STATUS TO OTHER ANTIBIOTIC AGENTS STATUS: ICD-10-CM

## 2021-07-24 DIAGNOSIS — Z79.84 LONG TERM (CURRENT) USE OF ORAL HYPOGLYCEMIC DRUGS: ICD-10-CM

## 2021-07-24 DIAGNOSIS — Z88.8 ALLERGY STATUS TO OTHER DRUGS, MEDICAMENTS AND BIOLOGICAL SUBSTANCES STATUS: ICD-10-CM

## 2021-07-24 DIAGNOSIS — Z98.89 OTHER SPECIFIED POSTPROCEDURAL STATES: Chronic | ICD-10-CM

## 2021-07-24 DIAGNOSIS — Z79.890 HORMONE REPLACEMENT THERAPY: ICD-10-CM

## 2021-07-24 DIAGNOSIS — Z85.828 PERSONAL HISTORY OF OTHER MALIGNANT NEOPLASM OF SKIN: Chronic | ICD-10-CM

## 2021-07-24 DIAGNOSIS — E11.9 TYPE 2 DIABETES MELLITUS WITHOUT COMPLICATIONS: ICD-10-CM

## 2021-07-24 LAB
ALBUMIN SERPL ELPH-MCNC: 4 G/DL — SIGNIFICANT CHANGE UP (ref 3.5–5.2)
ALP SERPL-CCNC: 101 U/L — SIGNIFICANT CHANGE UP (ref 30–115)
ALT FLD-CCNC: 13 U/L — SIGNIFICANT CHANGE UP (ref 0–41)
ANION GAP SERPL CALC-SCNC: 15 MMOL/L — HIGH (ref 7–14)
APPEARANCE UR: CLEAR — SIGNIFICANT CHANGE UP
AST SERPL-CCNC: 19 U/L — SIGNIFICANT CHANGE UP (ref 0–41)
BACTERIA # UR AUTO: NEGATIVE — SIGNIFICANT CHANGE UP
BASE EXCESS BLDV CALC-SCNC: 3.5 MMOL/L — HIGH (ref -2–2)
BASOPHILS # BLD AUTO: 0.01 K/UL — SIGNIFICANT CHANGE UP (ref 0–0.2)
BASOPHILS NFR BLD AUTO: 0.2 % — SIGNIFICANT CHANGE UP (ref 0–1)
BILIRUB SERPL-MCNC: 0.4 MG/DL — SIGNIFICANT CHANGE UP (ref 0.2–1.2)
BILIRUB UR-MCNC: NEGATIVE — SIGNIFICANT CHANGE UP
BUN SERPL-MCNC: 17 MG/DL — SIGNIFICANT CHANGE UP (ref 10–20)
CA-I SERPL-SCNC: 1.26 MMOL/L — SIGNIFICANT CHANGE UP (ref 1.12–1.3)
CALCIUM SERPL-MCNC: 9.3 MG/DL — SIGNIFICANT CHANGE UP (ref 8.5–10.1)
CHLORIDE SERPL-SCNC: 99 MMOL/L — SIGNIFICANT CHANGE UP (ref 98–110)
CO2 SERPL-SCNC: 23 MMOL/L — SIGNIFICANT CHANGE UP (ref 17–32)
COLOR SPEC: SIGNIFICANT CHANGE UP
CREAT SERPL-MCNC: 0.7 MG/DL — SIGNIFICANT CHANGE UP (ref 0.7–1.5)
DIFF PNL FLD: NEGATIVE — SIGNIFICANT CHANGE UP
EOSINOPHIL # BLD AUTO: 0.01 K/UL — SIGNIFICANT CHANGE UP (ref 0–0.7)
EOSINOPHIL NFR BLD AUTO: 0.2 % — SIGNIFICANT CHANGE UP (ref 0–8)
EPI CELLS # UR: 1 /HPF — SIGNIFICANT CHANGE UP (ref 0–5)
GAS PNL BLDV: 140 MMOL/L — SIGNIFICANT CHANGE UP (ref 136–145)
GAS PNL BLDV: SIGNIFICANT CHANGE UP
GLUCOSE SERPL-MCNC: 211 MG/DL — HIGH (ref 70–99)
GLUCOSE UR QL: ABNORMAL
HCO3 BLDV-SCNC: 28 MMOL/L — SIGNIFICANT CHANGE UP (ref 22–29)
HCT VFR BLD CALC: 40.7 % — SIGNIFICANT CHANGE UP (ref 37–47)
HCT VFR BLDA CALC: 40.1 % — SIGNIFICANT CHANGE UP (ref 34–44)
HGB BLD CALC-MCNC: 13.1 G/DL — LOW (ref 14–18)
HGB BLD-MCNC: 13.4 G/DL — SIGNIFICANT CHANGE UP (ref 12–16)
HYALINE CASTS # UR AUTO: 0 /LPF — SIGNIFICANT CHANGE UP (ref 0–7)
IMM GRANULOCYTES NFR BLD AUTO: 0.8 % — HIGH (ref 0.1–0.3)
KETONES UR-MCNC: ABNORMAL
LACTATE BLDV-MCNC: 1.2 MMOL/L — SIGNIFICANT CHANGE UP (ref 0.5–1.6)
LEUKOCYTE ESTERASE UR-ACNC: NEGATIVE — SIGNIFICANT CHANGE UP
LIDOCAIN IGE QN: 60 U/L — SIGNIFICANT CHANGE UP (ref 7–60)
LYMPHOCYTES # BLD AUTO: 0.56 K/UL — LOW (ref 1.2–3.4)
LYMPHOCYTES # BLD AUTO: 11 % — LOW (ref 20.5–51.1)
MCHC RBC-ENTMCNC: 27.7 PG — SIGNIFICANT CHANGE UP (ref 27–31)
MCHC RBC-ENTMCNC: 32.9 G/DL — SIGNIFICANT CHANGE UP (ref 32–37)
MCV RBC AUTO: 84.3 FL — SIGNIFICANT CHANGE UP (ref 81–99)
MONOCYTES # BLD AUTO: 0.32 K/UL — SIGNIFICANT CHANGE UP (ref 0.1–0.6)
MONOCYTES NFR BLD AUTO: 6.3 % — SIGNIFICANT CHANGE UP (ref 1.7–9.3)
NEUTROPHILS # BLD AUTO: 4.14 K/UL — SIGNIFICANT CHANGE UP (ref 1.4–6.5)
NEUTROPHILS NFR BLD AUTO: 81.5 % — HIGH (ref 42.2–75.2)
NITRITE UR-MCNC: NEGATIVE — SIGNIFICANT CHANGE UP
NRBC # BLD: 0 /100 WBCS — SIGNIFICANT CHANGE UP (ref 0–0)
PCO2 BLDV: 42 MMHG — SIGNIFICANT CHANGE UP (ref 41–51)
PH BLDV: 7.43 — SIGNIFICANT CHANGE UP (ref 7.26–7.43)
PH UR: 7.5 — SIGNIFICANT CHANGE UP (ref 5–8)
PLATELET # BLD AUTO: 69 K/UL — LOW (ref 130–400)
PO2 BLDV: 46 MMHG — HIGH (ref 20–40)
POTASSIUM BLDV-SCNC: 3.8 MMOL/L — SIGNIFICANT CHANGE UP (ref 3.3–5.6)
POTASSIUM SERPL-MCNC: 4.2 MMOL/L — SIGNIFICANT CHANGE UP (ref 3.5–5)
POTASSIUM SERPL-SCNC: 4.2 MMOL/L — SIGNIFICANT CHANGE UP (ref 3.5–5)
PROT SERPL-MCNC: 6.5 G/DL — SIGNIFICANT CHANGE UP (ref 6–8)
PROT UR-MCNC: ABNORMAL
RBC # BLD: 4.83 M/UL — SIGNIFICANT CHANGE UP (ref 4.2–5.4)
RBC # FLD: 13.5 % — SIGNIFICANT CHANGE UP (ref 11.5–14.5)
RBC CASTS # UR COMP ASSIST: 3 /HPF — SIGNIFICANT CHANGE UP (ref 0–4)
SAO2 % BLDV: 81 % — SIGNIFICANT CHANGE UP
SARS-COV-2 RNA SPEC QL NAA+PROBE: SIGNIFICANT CHANGE UP
SODIUM SERPL-SCNC: 137 MMOL/L — SIGNIFICANT CHANGE UP (ref 135–146)
SP GR SPEC: 1.02 — SIGNIFICANT CHANGE UP (ref 1.01–1.03)
TROPONIN T SERPL-MCNC: <0.01 NG/ML — SIGNIFICANT CHANGE UP
UROBILINOGEN FLD QL: SIGNIFICANT CHANGE UP
WBC # BLD: 5.08 K/UL — SIGNIFICANT CHANGE UP (ref 4.8–10.8)
WBC # FLD AUTO: 5.08 K/UL — SIGNIFICANT CHANGE UP (ref 4.8–10.8)
WBC UR QL: 3 /HPF — SIGNIFICANT CHANGE UP (ref 0–5)

## 2021-07-24 PROCEDURE — 99284 EMERGENCY DEPT VISIT MOD MDM: CPT

## 2021-07-24 PROCEDURE — 93010 ELECTROCARDIOGRAM REPORT: CPT

## 2021-07-24 PROCEDURE — 70450 CT HEAD/BRAIN W/O DYE: CPT | Mod: 26,MA

## 2021-07-24 PROCEDURE — 71045 X-RAY EXAM CHEST 1 VIEW: CPT | Mod: 26

## 2021-07-24 RX ORDER — ONDANSETRON 8 MG/1
4 TABLET, FILM COATED ORAL ONCE
Refills: 0 | Status: COMPLETED | OUTPATIENT
Start: 2021-07-24 | End: 2021-07-24

## 2021-07-24 RX ORDER — SODIUM CHLORIDE 9 MG/ML
1000 INJECTION INTRAMUSCULAR; INTRAVENOUS; SUBCUTANEOUS ONCE
Refills: 0 | Status: COMPLETED | OUTPATIENT
Start: 2021-07-24 | End: 2021-07-24

## 2021-07-24 RX ADMIN — ONDANSETRON 4 MILLIGRAM(S): 8 TABLET, FILM COATED ORAL at 17:44

## 2021-07-24 RX ADMIN — SODIUM CHLORIDE 1000 MILLILITER(S): 9 INJECTION INTRAMUSCULAR; INTRAVENOUS; SUBCUTANEOUS at 17:43

## 2021-07-24 NOTE — ED ADULT NURSE REASSESSMENT NOTE - NS ED NURSE REASSESS COMMENT FT1
Patient complains of funny sensation in the face . Patient can feel touch denied any numbness , droop or tingling sensation . GCS 15 .Able to follow commands  Patient feeling better with nausea . BERLIN Castaneda notified about patient new complaints

## 2021-07-24 NOTE — ED PROVIDER NOTE - OBJECTIVE STATEMENT
89 year old female with pmhx of dm presents with nausea and vomiting since this morning. symptoms began after eating breakfast. no chest pain, sob, abd pain, fever, chills, or urinary symptoms.

## 2021-07-24 NOTE — ED PROVIDER NOTE - PATIENT PORTAL LINK FT
You can access the FollowMyHealth Patient Portal offered by [facility] by registering at the following website: [email or web address]. By joining [employer]’s FollowMyHealth portal, you will also be able to view your health information using other applications (apps) compatible with our system.

## 2021-07-24 NOTE — ED ADULT TRIAGE NOTE - SOURCE OF INFORMATION
Patient says she misplaced her medication . She has looked everywhere in her house , her car and can't find it . She stated he really need this medication she's under a lot of stress dealing with her  dementia .    Patient

## 2021-07-24 NOTE — ED PROVIDER NOTE - ATTENDING CONTRIBUTION TO CARE
89 y/o female with hx of DM presents to ED with NV x 1 day, began after eating breakfast.  No abdominal pain.  No CP/SOB.  No fever or chills.  No other complaints.  PE:  agree with above.  A/P:  Vomiting.  Labs, Meds, reassess.

## 2021-07-24 NOTE — ED PROVIDER NOTE - NSFOLLOWUPINSTRUCTIONS_ED_ALL_ED_FT

## 2021-07-26 LAB
CULTURE RESULTS: SIGNIFICANT CHANGE UP
SPECIMEN SOURCE: SIGNIFICANT CHANGE UP

## 2021-07-28 ENCOUNTER — OUTPATIENT (OUTPATIENT)
Dept: OUTPATIENT SERVICES | Facility: HOSPITAL | Age: 86
LOS: 1 days | Discharge: HOME | End: 2021-07-28

## 2021-07-28 ENCOUNTER — APPOINTMENT (OUTPATIENT)
Dept: NUTRITION | Facility: CLINIC | Age: 86
End: 2021-07-28

## 2021-07-28 DIAGNOSIS — Z85.828 PERSONAL HISTORY OF OTHER MALIGNANT NEOPLASM OF SKIN: Chronic | ICD-10-CM

## 2021-07-28 DIAGNOSIS — E11.9 TYPE 2 DIABETES MELLITUS WITHOUT COMPLICATIONS: ICD-10-CM

## 2021-07-28 DIAGNOSIS — Z98.89 OTHER SPECIFIED POSTPROCEDURAL STATES: Chronic | ICD-10-CM

## 2021-08-30 ENCOUNTER — APPOINTMENT (OUTPATIENT)
Dept: UROLOGY | Facility: CLINIC | Age: 86
End: 2021-08-30

## 2021-11-02 ENCOUNTER — INPATIENT (INPATIENT)
Facility: HOSPITAL | Age: 86
LOS: 4 days | Discharge: NURSING, REHAB OR LONG-TERM CARE | End: 2021-11-07
Attending: INTERNAL MEDICINE | Admitting: INTERNAL MEDICINE
Payer: MEDICARE

## 2021-11-02 VITALS
SYSTOLIC BLOOD PRESSURE: 148 MMHG | TEMPERATURE: 98 F | HEIGHT: 62 IN | HEART RATE: 78 BPM | RESPIRATION RATE: 18 BRPM | DIASTOLIC BLOOD PRESSURE: 69 MMHG | OXYGEN SATURATION: 98 %

## 2021-11-02 DIAGNOSIS — Z85.828 PERSONAL HISTORY OF OTHER MALIGNANT NEOPLASM OF SKIN: Chronic | ICD-10-CM

## 2021-11-02 DIAGNOSIS — Z98.89 OTHER SPECIFIED POSTPROCEDURAL STATES: Chronic | ICD-10-CM

## 2021-11-02 LAB
ALBUMIN SERPL ELPH-MCNC: 3.2 G/DL — LOW (ref 3.5–5.2)
ALP SERPL-CCNC: 98 U/L — SIGNIFICANT CHANGE UP (ref 30–115)
ALT FLD-CCNC: 14 U/L — SIGNIFICANT CHANGE UP (ref 0–41)
ANION GAP SERPL CALC-SCNC: 12 MMOL/L — SIGNIFICANT CHANGE UP (ref 7–14)
AST SERPL-CCNC: 14 U/L — SIGNIFICANT CHANGE UP (ref 0–41)
BASOPHILS # BLD AUTO: 0.01 K/UL — SIGNIFICANT CHANGE UP (ref 0–0.2)
BASOPHILS NFR BLD AUTO: 0.1 % — SIGNIFICANT CHANGE UP (ref 0–1)
BILIRUB SERPL-MCNC: 0.3 MG/DL — SIGNIFICANT CHANGE UP (ref 0.2–1.2)
BUN SERPL-MCNC: 28 MG/DL — HIGH (ref 10–20)
CALCIUM SERPL-MCNC: 8.9 MG/DL — SIGNIFICANT CHANGE UP (ref 8.5–10.1)
CHLORIDE SERPL-SCNC: 100 MMOL/L — SIGNIFICANT CHANGE UP (ref 98–110)
CO2 SERPL-SCNC: 23 MMOL/L — SIGNIFICANT CHANGE UP (ref 17–32)
CREAT SERPL-MCNC: 1.2 MG/DL — SIGNIFICANT CHANGE UP (ref 0.7–1.5)
EOSINOPHIL # BLD AUTO: 0.07 K/UL — SIGNIFICANT CHANGE UP (ref 0–0.7)
EOSINOPHIL NFR BLD AUTO: 0.9 % — SIGNIFICANT CHANGE UP (ref 0–8)
GLUCOSE SERPL-MCNC: 223 MG/DL — HIGH (ref 70–99)
HCT VFR BLD CALC: 35.4 % — LOW (ref 37–47)
HGB BLD-MCNC: 11.7 G/DL — LOW (ref 12–16)
IMM GRANULOCYTES NFR BLD AUTO: 0.8 % — HIGH (ref 0.1–0.3)
LYMPHOCYTES # BLD AUTO: 1.21 K/UL — SIGNIFICANT CHANGE UP (ref 1.2–3.4)
LYMPHOCYTES # BLD AUTO: 15.8 % — LOW (ref 20.5–51.1)
MCHC RBC-ENTMCNC: 27.3 PG — SIGNIFICANT CHANGE UP (ref 27–31)
MCHC RBC-ENTMCNC: 33.1 G/DL — SIGNIFICANT CHANGE UP (ref 32–37)
MCV RBC AUTO: 82.7 FL — SIGNIFICANT CHANGE UP (ref 81–99)
MONOCYTES # BLD AUTO: 0.9 K/UL — HIGH (ref 0.1–0.6)
MONOCYTES NFR BLD AUTO: 11.7 % — HIGH (ref 1.7–9.3)
NEUTROPHILS # BLD AUTO: 5.42 K/UL — SIGNIFICANT CHANGE UP (ref 1.4–6.5)
NEUTROPHILS NFR BLD AUTO: 70.7 % — SIGNIFICANT CHANGE UP (ref 42.2–75.2)
NRBC # BLD: 0 /100 WBCS — SIGNIFICANT CHANGE UP (ref 0–0)
PLATELET # BLD AUTO: 65 K/UL — LOW (ref 130–400)
POTASSIUM SERPL-MCNC: 4.2 MMOL/L — SIGNIFICANT CHANGE UP (ref 3.5–5)
POTASSIUM SERPL-SCNC: 4.2 MMOL/L — SIGNIFICANT CHANGE UP (ref 3.5–5)
PROT SERPL-MCNC: 6.1 G/DL — SIGNIFICANT CHANGE UP (ref 6–8)
RBC # BLD: 4.28 M/UL — SIGNIFICANT CHANGE UP (ref 4.2–5.4)
RBC # FLD: 13.9 % — SIGNIFICANT CHANGE UP (ref 11.5–14.5)
SARS-COV-2 RNA SPEC QL NAA+PROBE: SIGNIFICANT CHANGE UP
SODIUM SERPL-SCNC: 135 MMOL/L — SIGNIFICANT CHANGE UP (ref 135–146)
WBC # BLD: 7.67 K/UL — SIGNIFICANT CHANGE UP (ref 4.8–10.8)
WBC # FLD AUTO: 7.67 K/UL — SIGNIFICANT CHANGE UP (ref 4.8–10.8)

## 2021-11-02 PROCEDURE — 73610 X-RAY EXAM OF ANKLE: CPT | Mod: 26,RT

## 2021-11-02 PROCEDURE — 73590 X-RAY EXAM OF LOWER LEG: CPT | Mod: 26,RT

## 2021-11-02 PROCEDURE — 73630 X-RAY EXAM OF FOOT: CPT | Mod: 26,RT

## 2021-11-02 PROCEDURE — 99218: CPT

## 2021-11-02 RX ORDER — ACETAMINOPHEN 500 MG
650 TABLET ORAL ONCE
Refills: 0 | Status: COMPLETED | OUTPATIENT
Start: 2021-11-02 | End: 2021-11-02

## 2021-11-02 RX ORDER — METFORMIN HYDROCHLORIDE 850 MG/1
500 TABLET ORAL ONCE
Refills: 0 | Status: COMPLETED | OUTPATIENT
Start: 2021-11-03 | End: 2021-11-03

## 2021-11-02 RX ORDER — SERTRALINE 25 MG/1
25 TABLET, FILM COATED ORAL ONCE
Refills: 0 | Status: COMPLETED | OUTPATIENT
Start: 2021-11-03 | End: 2021-11-03

## 2021-11-02 RX ORDER — LEVOTHYROXINE SODIUM 125 MCG
25 TABLET ORAL ONCE
Refills: 0 | Status: COMPLETED | OUTPATIENT
Start: 2021-11-03 | End: 2021-11-03

## 2021-11-02 RX ADMIN — Medication 650 MILLIGRAM(S): at 17:26

## 2021-11-02 NOTE — ED ADULT NURSE NOTE - NSIMPLEMENTINTERV_GEN_ALL_ED
Implemented All Fall Risk Interventions:  [place] to call system. Call bell, personal items and telephone within reach. Instruct patient to call for assistance. Room bathroom lighting operational. Non-slip footwear when patient is off stretcher. Physically safe environment: no spills, clutter or unnecessary equipment. Stretcher in lowest position, wheels locked, appropriate side rails in place. Provide visual cue, wrist band, yellow gown, etc. Monitor gait and stability. Monitor for mental status changes and reorient to person, place, and time. Review medications for side effects contributing to fall risk. Reinforce activity limits and safety measures with patient and family.

## 2021-11-02 NOTE — ED CDU PROVIDER INITIAL DAY NOTE - PHYSICAL EXAMINATION
CONSTITUTIONAL: Well-appearing; well-nourished; in no apparent distress.   EYES: PERRL; EOM intact.   ENT: normal nose; no rhinorrhea; normal pharynx with no tonsillar hypertrophy.   NECK: Supple; non-tender; no cervical lymphadenopathy.   CARDIOVASCULAR: Normal S1, S2; no murmurs, rubs, or gallops.   RESPIRATORY: Normal chest excursion with respiration; breath sounds clear and equal bilaterally; no wheezes, rhonchi, or rales.  GI/: Normal bowel sounds; non-distended; non-tender; no palpable organomegaly.   MS: + RLE in posterior splint. Cap refill intact. Sensation intact  SKIN: Normal for age and race; warm; dry; good turgor; no apparent lesions or exudate.   NEURO/PSYCH: A & O x 4; grossly unremarkable. mood and manner are appropriate. Grooming and personal hygiene are appropriate. neurovascular intact

## 2021-11-02 NOTE — ED PROVIDER NOTE - NS ED ROS FT
Constitutional: (-) fever (-) malaise (-) diaphoresis (-) chills (-) wt. loss (-) body aches (-) night sweats  Eyes: (-) visual changes (-) eye pain (-) eye discharge (-) photophobia (-) FB sensation  Neck: (-) neck pain (-) neck stiffness  Cardiac: (-) chest pain  (-) palpitations (-) syncope (-) edema  Respiratory: (-) SOB (-) JONES  GI: (-) nausea (-) vomiting (-) diarrhea (-) abdominal pain  : (-) dysuria (-) increased frequency  (-) hematuria (-) incontinence  MS: (-) back pain (+)R ankle injury  Neuro: (-) headache (-) dizziness (-) numbness/tingling to extremities B/L (-) weakness (-) LOC (-) head injury   Skin: (-) rash (-) laceration    Except as documented in the HPI, all other systems are negative.

## 2021-11-02 NOTE — ED ADULT NURSE NOTE - OBJECTIVE STATEMENT
Patient stated she fell after missing a step at home, patient stated she hurt her right ankle. patient denies any chest pain at this time, denies N/V/D, PATIENT DENIES ANY BLOOD THINNERS.

## 2021-11-02 NOTE — ED PROVIDER NOTE - ATTENDING CONTRIBUTION TO CARE
89yo woman h/o HLD, DM c/o R ankle pain s/p mechanical slip and fall PTA. No head injury, no other pain. VS, exam as noted, swelling at the lateral malleolus. Suspect fx, will do XR, reassess. Pt normally walks with combo of walker/cane at home, lives with partner with family nearby.

## 2021-11-02 NOTE — ED PROVIDER NOTE - PHYSICAL EXAMINATION
GENERAL: Well-nourished, Well-developed. NAD.  HEAD: No visible or palpable bumps or hematomas. No ecchymosis behind ears B/L.  Eyes: PERRLA, EOMI. No asymmetry. No nystagmus. No conjunctival injection. Non-icteric sclera.  ENMT: MMM.   Neck: Supple. FROM  CVS: Normal S1,S2. No murmurs appreciated on auscultation   RESP: No use of accessory muscles. Chest rise symmetrical with good expansion. Lungs clear to auscultation B/L. No wheezing, rales, or rhonchi auscultated.  GI: Normal auscultation of bowel sounds in all 4 quadrants. Soft, Nontender, Nondistended. No guarding or rebound tenderness. No CVAT B/L.  MSK: (+)R ankle swelling, ttp lateral malleolar region. FROM. remainder of extremities no ttp, deformities or swelling.  Skin: Warm, Dry. No rashes or lesions. Good cap refill < 2 sec B/L.  EXT: Radial and pedal pulses present B/L. No calf tenderness or swelling B/L. No palpable cords. No pedal edema B/L.  Neuro: NVI

## 2021-11-02 NOTE — ED CDU PROVIDER INITIAL DAY NOTE - NS ED ROS FT
Constitutional: no fever, chills, no recent weight loss, change in appetite or malaise  Eyes: no redness/discharge/pain/vision changes  ENT: no rhinorrhea/ear pain/sore throat  Cardiac: No chest pain, SOB or edema.  Respiratory: No cough or respiratory distress  GI: No nausea, vomiting, diarrhea or abdominal pain.  : No dysuria, frequency, urgency or hematuria  MS: + rt ankle pain. no loss of ROM, no weakness  Neuro: No headache or weakness. No LOC.  Skin: No skin rash.  Endocrine: + hx of DM/ thyroid dz  Except as documented in the HPI, all other systems are negative.

## 2021-11-02 NOTE — ED PROVIDER NOTE - OBJECTIVE STATEMENT
89 yo F pmhx DM, HLD presenting to the ED for evaluation of R ankle pain/injury s/p mechanical trip and fall about 1 hour prior to arrival. Pt reports she ambulates with walker at baseline, pt was walking up steps (without walker), twisted her R ankle, inversion injury,  called ems due to inability to ambulate. Pt reporting constant, throbbing, non radiating, R ankle pain, worse with movement and palpation. Pt denies any head trauma, loc, not on anticoagulation. Denies any back pain, neck pain, headache, dizziness, chest pain, sob, nausea, vomiting, weakness, numbness/tingling, lacerations/abrasions.

## 2021-11-02 NOTE — ED PROVIDER NOTE - CLINICAL SUMMARY MEDICAL DECISION MAKING FREE TEXT BOX
XR with distal fibula fx. Splint placed but pt nonambulatory even with walker in the ED; labs sent, will admit.

## 2021-11-02 NOTE — ED ADULT TRIAGE NOTE - CHIEF COMPLAINT QUOTE
fell forward walking up carpeted steps at home, twisted right ankle.  no head injury, not on blood thinners

## 2021-11-03 LAB
GLUCOSE BLDC GLUCOMTR-MCNC: 160 MG/DL — HIGH (ref 70–99)
GLUCOSE BLDC GLUCOMTR-MCNC: 193 MG/DL — HIGH (ref 70–99)
GLUCOSE BLDC GLUCOMTR-MCNC: 207 MG/DL — HIGH (ref 70–99)
GLUCOSE BLDC GLUCOMTR-MCNC: 268 MG/DL — HIGH (ref 70–99)

## 2021-11-03 PROCEDURE — 99217: CPT

## 2021-11-03 PROCEDURE — 93010 ELECTROCARDIOGRAM REPORT: CPT

## 2021-11-03 RX ORDER — HALOPERIDOL DECANOATE 100 MG/ML
5 INJECTION INTRAMUSCULAR ONCE
Refills: 0 | Status: COMPLETED | OUTPATIENT
Start: 2021-11-03 | End: 2021-11-03

## 2021-11-03 RX ORDER — TRAZODONE HCL 50 MG
50 TABLET ORAL ONCE
Refills: 0 | Status: COMPLETED | OUTPATIENT
Start: 2021-11-03 | End: 2021-11-03

## 2021-11-03 RX ORDER — ATORVASTATIN CALCIUM 80 MG/1
20 TABLET, FILM COATED ORAL AT BEDTIME
Refills: 0 | Status: DISCONTINUED | OUTPATIENT
Start: 2021-11-03 | End: 2021-11-07

## 2021-11-03 RX ORDER — ACETAMINOPHEN 500 MG
975 TABLET ORAL ONCE
Refills: 0 | Status: COMPLETED | OUTPATIENT
Start: 2021-11-03 | End: 2021-11-03

## 2021-11-03 RX ORDER — HEPARIN SODIUM 5000 [USP'U]/ML
5000 INJECTION INTRAVENOUS; SUBCUTANEOUS EVERY 12 HOURS
Refills: 0 | Status: DISCONTINUED | OUTPATIENT
Start: 2021-11-03 | End: 2021-11-07

## 2021-11-03 RX ORDER — LEVOTHYROXINE SODIUM 125 MCG
88 TABLET ORAL DAILY
Refills: 0 | Status: DISCONTINUED | OUTPATIENT
Start: 2021-11-04 | End: 2021-11-07

## 2021-11-03 RX ORDER — LEVOTHYROXINE SODIUM 125 MCG
88 TABLET ORAL DAILY
Refills: 0 | Status: DISCONTINUED | OUTPATIENT
Start: 2021-11-03 | End: 2021-11-03

## 2021-11-03 RX ADMIN — METFORMIN HYDROCHLORIDE 500 MILLIGRAM(S): 850 TABLET ORAL at 07:08

## 2021-11-03 RX ADMIN — SERTRALINE 25 MILLIGRAM(S): 25 TABLET, FILM COATED ORAL at 07:07

## 2021-11-03 RX ADMIN — Medication 25 MICROGRAM(S): at 07:07

## 2021-11-03 RX ADMIN — HALOPERIDOL DECANOATE 5 MILLIGRAM(S): 100 INJECTION INTRAMUSCULAR at 12:24

## 2021-11-03 RX ADMIN — ATORVASTATIN CALCIUM 20 MILLIGRAM(S): 80 TABLET, FILM COATED ORAL at 22:02

## 2021-11-03 RX ADMIN — Medication 975 MILLIGRAM(S): at 04:42

## 2021-11-03 RX ADMIN — Medication 650 MILLIGRAM(S): at 07:20

## 2021-11-03 RX ADMIN — HEPARIN SODIUM 5000 UNIT(S): 5000 INJECTION INTRAVENOUS; SUBCUTANEOUS at 17:40

## 2021-11-03 NOTE — ED CDU PROVIDER SUBSEQUENT DAY NOTE - PROGRESS NOTE DETAILS
pt stable, no acute complaints, pending pt/sw consult. Received sign out from BERLIN Blair; PT evaluating patient at bedside. PT completed evaluation; most suitable for a rehabilitation facility for ambulation training. Patient amenable to this plan. Called over by RN as patient was attempting to maneuver out of bed. Explained to patient and she understands to wait for assistance. 1:1 ordered and charge RN aware. Patient admitted to medicine to further facilitate placement. Spoke with patient's family member Aguila who will come to see patient around 11am.

## 2021-11-03 NOTE — H&P ADULT - NSHPREVIEWOFSYSTEMS_GEN_ALL_CORE
CONSTITUTIONAL: No weakness, fevers or chills; No headaches  EYES: No visual changes, eye pain, or discharge  ENT: No vertigo; No ear pain or change in hearing; No sore throat or difficulty swallowing  NECK: No pain or stiffness  RESPIRATORY: No cough, wheezing, or hemoptysis; No shortness of breath  CARDIOVASCULAR: No chest pain or palpitations  GASTROINTESTINAL: No abdominal or epigastric pain; No nausea, vomiting, or hematemesis; No diarrhea or constipation; No melena or hematochezia  GENITOURINARY: No dysuria, frequency or hematuria  MUSCULOSKELETAL: No joint pain, no muscle pain, no weakness  NEUROLOGICAL: No numbness or weakness  SKIN: No itching or rashes CONSTITUTIONAL: No weakness, fevers or chills; No headaches  EYES: No visual changes, eye pain, or discharge  ENT: No vertigo; No ear pain or change in hearing; No sore throat or difficulty swallowing  NECK: No pain or stiffness  RESPIRATORY: No cough, wheezing, or hemoptysis; No shortness of breath  CARDIOVASCULAR: No chest pain or palpitations  GASTROINTESTINAL: No abdominal or epigastric pain; No nausea, vomiting, or hematemesis; No diarrhea or constipation; No melena or hematochezia  GENITOURINARY: No dysuria, frequency or hematuria  MUSCULOSKELETAL:  right ankle pain and swelling   NEUROLOGICAL: No numbness or weakness  SKIN: No itching or rashes

## 2021-11-03 NOTE — CONSULT NOTE ADULT - ASSESSMENT
IMPRESSION: Rehab of right fibular fx    PRECAUTIONS: [   ] Cardiac  [   ] Respiratory  [   ] Seizures [   ] Contact Isolation  [   ] Droplet Isolation  [   ] Other    Weight Bearing Status: PWB RLE until ortho clarify wb status    RECOMMENDATION:    Out of Bed to Chair     DVT/Decubiti Prophylaxis    REHAB PLAN:     [ x   ] Bedside P/T 3-5 times a week   [ x   ]   Bedside O/T  2-3 times a week             [    ] Speech Therapy               [    ]  No Rehab Therapy Indicated   Conditioning/ROM                                    ADL  Bed Mobility                                               Conditioning/ROM  Transfers                                                     Bed Mobility  Sitting /Standing Balance                         Transfers                                        Gait Training                                               Sitting/Standing Balance  Stair Training [   ]Applicable                    Home equipment Eval                                                                        Splinting  [   ] Only      GOALS:   ADL   [  x  ]   Independent                    Transfers  [  x  ] Independent                          Ambulation  [ x   ] Independent     [ x    ] With device                            [    ]  CG                                                         [    ]  CG                                                                  [    ] CG                            [    ] Min A                                                   [    ] Min A                                                              [    ] Min  A          DISCHARGE PLAN:   [    ]  Good candidate for Intensive Rehabilitation/Hospital based                                             Will tolerate 3hrs Intensive Rehab Daily                                       [     ]  Short Term Rehab in Skilled Nursing Facility                                       [     ]  Home with Outpatient or  services                                         [   x  ]  Possible Candidate for Intensive Hospital based Rehab

## 2021-11-03 NOTE — H&P ADULT - HISTORY OF PRESENT ILLNESS
Patient is a 89 year old female with PMHx of PUD, GERD, Type 2 Diabetes Mellitus, hypothyroidism, and  hyperlipidemia presenting to the ED s/p fall at home. The patient ambulated with a walker at baseline. She was walking up the steps of her home without the walker and twisted her right ankle and fell to the ground. She is unsure how she landed as the incident happened so quickly. Denies head trauma or LOC. Patient  called EMS as the patient was not able to ambulate after the fall.  Patient denies hip/back/neck pain, denies any further trauma, syncope, chest pain, dizziness, shortness of breath, nasuea/vomitting, or abdominal pain.     In the ED, patient was hypertensive to 206/93. BP improved without administration of antihypertensive agents, now 115/71. XR imaging reveals acute, minimally displaced right distal fibular fracture. Patient was seen by PT in the ED and recommended for rehabilitation facility for ambulation training. Patient to be admitted to medicine for further eval and likely rehab placement.

## 2021-11-03 NOTE — CONSULT NOTE ADULT - SUBJECTIVE AND OBJECTIVE BOX
HPI:  Patient is a 89 year old female with PMHx of PUD, GERD, Type 2 Diabetes Mellitus, hypothyroidism, and  hyperlipidemia presenting to the ED s/p fall at home. The patient ambulated with a cane at baseline. She was walking up the steps of her home without the walker and twisted her right ankle and fell to the ground. She is unsure how she landed as the incident happened so quickly. Denies head trauma or LOC. Patient  called EMS as the patient was not able to ambulate after the fall.  Patient denies hip/back/neck pain, denies any further trauma, syncope, chest pain, dizziness, shortness of breath, nasuea/vomitting, or abdominal pain.     In the ED, patient was hypertensive to 206/93. BP improved without administration of antihypertensive agents, now 115/71. XR imaging reveals acute, minimally displaced right distal fibular fracture. Patient was seen by PT in the ED and recommended for rehabilitation facility for ambulation training. Patient to be admitted to medicine for further eval and likely rehab placement.          PAST MEDICAL & SURGICAL HISTORY:  DM (diabetes mellitus)    GI bleeding    S/P D&amp;C (status post dilation and curettage)    H/O Moh&#x27;s micrographic surgery for skin cancer  [facility] Course:    TODAY'S SUBJECTIVE & REVIEW OF SYMPTOMS:     Constitutional WNL   Cardio WNL   Resp WNL   GI WNL  Heme WNL  Endo WNL  Skin WNL  MSK pain  Neuro WNL  Cognitive WNL  Psych WNL      MEDICATIONS  (STANDING):  atorvastatin 20 milliGRAM(s) Oral at bedtime  heparin SubCutaneous Injection - Peds 5000 Unit(s) SubCutaneous every 12 hours    MEDICATIONS  (PRN):      FAMILY HISTORY:  Family history of colon cancer (Mother)    Family history of lung cancer (Father)        Allergies    cephalexin (Swelling)  Cipro (Hives)    Intolerances        SOCIAL HISTORY:    [  ] Etoh  [  ] Smoking  [  ] Substance abuse     Home Environment:  [   ] Home Alone  [ x  ] Lives with Family  [   ] Home Health Aid    Dwelling:  [   ] Apartment  [ x  ] Private House  [   ] Adult Home  [   ] Skilled Nursing Facility      [   ] Short Term  [   ] Long Term  [ x  ] Stairs       Elevator [   ]    FUNCTIONAL STATUS PTA: (Check all that apply)  Ambulation: [  x  ]Independent    [   ] Dependent     [   ] Non-Ambulatory  Assistive Device: [ x  ] SA Cane  [   ]  Q Cane  [   ] Walker  [   ]  Wheelchair  ADL : [ x  ] Independent  [    ]  Dependent       Vital Signs Last 24 Hrs  T(C): 35.6 (03 Nov 2021 11:00), Max: 36.5 (02 Nov 2021 16:20)  T(F): 96.1 (03 Nov 2021 11:00), Max: 97.7 (02 Nov 2021 16:20)  HR: 94 (03 Nov 2021 13:00) (78 - 96)  BP: 152/84 (03 Nov 2021 13:00) (115/71 - 206/93)  BP(mean): --  RR: 18 (03 Nov 2021 13:00) (18 - 18)  SpO2: 97% (03 Nov 2021 11:00) (96% - 98%)      PHYSICAL EXAM: Awake & Alert  GENERAL: NAD  HEAD:  Normocephalic  CHEST/LUNG: Clear   HEART: S1S2+  ABDOMEN: Soft, Nontender  EXTREMITIES:  no left calf tenderness    NERVOUS SYSTEM:  Cranial Nerves 2-12 intact [   ] Abnormal  [   ]  ROM: WFL all extremities [   ]  Abnormal [x   ]limited rle  Motor Strength: WFL all extremities  [   ]  Abnormal [  x ]limited rle  Sensation: intact to light touch [  x ] Abnormal [   ]    FUNCTIONAL STATUS:  Bed Mobility: Independent [   ]  Supervision [   ]  Needs Assistance [  x ]  N/A [   ]  Transfers: Independent [   ]  Supervision [   ]  Needs Assistance [ x  ]  N/A [   ]   Ambulation: Independent [   ]  Supervision [   ]  Needs Assistance [ x  ]  N/A [   ]  ADL: Independent [   ] Requires Assistance [   ] N/A [   ]      LABS:                        11.7   7.67  )-----------( 65       ( 02 Nov 2021 19:25 )             35.4     11-02    135  |  100  |  28<H>  ----------------------------<  223<H>  4.2   |  23  |  1.2    Ca    8.9      02 Nov 2021 19:25    TPro  6.1  /  Alb  3.2<L>  /  TBili  0.3  /  DBili  x   /  AST  14  /  ALT  14  /  AlkPhos  98  11-02          RADIOLOGY & ADDITIONAL STUDIES:

## 2021-11-03 NOTE — H&P ADULT - ASSESSMENT
Patient is a 89 year old female with PMHx of PUD,  GERD, Type 2 Diabetes Mellitus, hypothyroidism, hyperlipidemia presenting to the ED s/p fall at home. The patient ambulated with a walker at baseline and was walking up the steps of her home without the walker and twisted her right ankle. Denies head trauma or LOC. Patient  called EMS as the patient was not able to ambulate after the fall.  Patient denies hip and back pain, denies any further trauma, syncope, chest pain, dizziness, shortness of breath, nasuea/vomitting, or abdominal pain.     In the ED, patient was hypertensive to 206/93. BP improved without administration of anti-hypertesive agents now 115/71. XR imaging reveals acute, minimally displaced right distal fibular fracture. Patient was seen by PT in the ED and recommended for rehabilitation facility for ambulation training.     Right Fibular Fracture  - Xray Foot AP + Lateral + Oblique, Right (11.02.21) Acute, minimally displaced right distal fibular fracture.  - Follow up PT recommendations recommending rehabilitation   - Rehab consult   - Orthopedics consult: recommendations on weight bearing status and need for any intervention/ortho follow up     HTN:   -     Hypothyroidism:     Diabetes Mellitus Type 2:         #Misc  - DVT Prophylaxis: heparin   - GI Prophylaxis: PPI   - Diet: DASH/TLC/CC  - Activity: IAT   - IV Fluids: n/a  - Code Status: Full code   - Dispo: admit to medicine, rehab placement          Patient is a 89 year old female with PMHx of PUD, GERD, Type 2 Diabetes Mellitus, hypothyroidism, and  hyperlipidemia presenting to the ED s/p fall at home. The patient ambulated with a walker at baseline. She was walking up the steps of her home without the walker and twisted her right ankle and fell to the ground. She is unsure how she landed as the incident happened so quickly. Denies head trauma or LOC. Patient  called EMS as the patient was not able to ambulate after the fall.  Patient denies hip/back/neck pain, denies any further trauma, syncope, chest pain, dizziness, shortness of breath, nasuea/vomitting, or abdominal pain.     In the ED, patient was hypertensive to 206/93. BP improved without administration of antihypertensive agents, now 115/71. XR imaging reveals acute, minimally displaced right distal fibular fracture. Patient was seen by PT in the ED and recommended for rehabilitation facility for ambulation training. Patient to be admitted to medicine for further eval and likely rehab placement.     Right Fibular Fracture  - Xray Foot AP + Lateral + Oblique, Right (11.02.21) Acute, minimally displaced right distal fibular fracture.  - Follow up PT consult- will likely need STR   - Follow up Rehab consult for STR placement  - Orthopedics consult: recommendations on weight bearing status and need for any intervention/ortho follow up  - pain control with Tylenol PRN     HTN:   - hypertensive urgency in the ED resolved without administration of anti-hypertensives  - monitor BP, initiate therapy as needed     Hypothyroidism:   - c/w home dose levothyroxine 88 mcg     Diabetes Mellitus Type 2:   - home medication: metformin 500 mg BID   - hold oral medication while inpatient   - monitor FSG qACHS, maintain <180     GERD  - c/w pantoprazole 40 mg qd     HLD:  - c/w home medications: atorvastatin 20 mg     #Misc  - DVT Prophylaxis: heparin   - GI Prophylaxis: PPI   - Diet: DASH/TLC/CC  - Activity: IAT   - IV Fluids: n/a  - Code Status: Full code   - Dispo: admit to medicine, rehab placement    Patient is a 89 year old female with PMHx of PUD, GERD, Type 2 Diabetes Mellitus, hypothyroidism, and  hyperlipidemia presenting to the ED s/p fall at home. The patient ambulated with a walker at baseline. She was walking up the steps of her home without the walker and twisted her right ankle and fell to the ground. She is unsure how she landed as the incident happened so quickly. Denies head trauma or LOC. Patient  called EMS as the patient was not able to ambulate after the fall.  Patient denies hip/back/neck pain, denies any further trauma, syncope, chest pain, dizziness, shortness of breath, nasuea/vomitting, or abdominal pain.     In the ED, patient was hypertensive to 206/93. BP improved without administration of antihypertensive agents, now 115/71. XR imaging reveals acute, minimally displaced right distal fibular fracture. Patient was seen by PT in the ED and recommended for rehabilitation facility for ambulation training. Patient to be admitted to medicine for further eval and likely rehab placement.     **Medication reconciliation completed with patient but on review, patient was given Trazadone sertraline, and lower dose of levothyroxine in ED, no pharmacy in chart and attempted to call patients contact but did not answer the phone, please discus with family patients medication reconciliation**    Right Fibular Fracture  - Xray Foot AP + Lateral + Oblique, Right (11.02.21) Acute, minimally displaced right distal fibular fracture.  - Follow up PT consult- will likely need STR   - Follow up Rehab consult for STR placement  - Orthopedics consult: recommendations on weight bearing status and need for any intervention/ortho follow up  - pain control with Tylenol PRN     HTN:   - hypertensive urgency in the ED resolved without administration of anti-hypertensives  - monitor BP, initiate therapy as needed     Hypothyroidism:   - c/w home dose levothyroxine 88 mcg     Diabetes Mellitus Type 2:   - home medication: metformin 500 mg BID   - hold oral medication while inpatient   - monitor FSG qACHS, maintain <180     GERD  - c/w pantoprazole 40 mg qd     HLD:  - c/w home medications: atorvastatin 20 mg     #Misc  - DVT Prophylaxis: heparin   - GI Prophylaxis: PPI   - Diet: DASH/TLC/CC  - Activity: IAT   - IV Fluids: n/a  - Code Status: Full code   - Dispo: admit to medicine, rehab placement   - Med Rec: please review with patients family to clarify dose of levothyroxine and if patient is still taking sertraline and trazadone

## 2021-11-03 NOTE — H&P ADULT - NSHPLABSRESULTS_GEN_ALL_CORE
VITAL SIGNS: Last 24 Hours  T(C): 36.4 (03 Nov 2021 07:49), Max: 36.5 (02 Nov 2021 16:20)  T(F): 97.6 (03 Nov 2021 07:49), Max: 97.7 (02 Nov 2021 16:20)  HR: 85 (03 Nov 2021 07:49) (78 - 96)  BP: 115/71 (03 Nov 2021 07:49) (115/71 - 206/93)  BP(mean): --  RR: 18 (03 Nov 2021 04:23) (18 - 18)  SpO2: 96% (03 Nov 2021 07:49) (96% - 98%)    LABS:                        11.7   7.67  )-----------( 65       ( 02 Nov 2021 19:25 )             35.4     11-02    135  |  100  |  28<H>  ----------------------------<  223<H>  4.2   |  23  |  1.2    Ca    8.9      02 Nov 2021 19:25    TPro  6.1  /  Alb  3.2<L>  /  TBili  0.3  /  DBili  x   /  AST  14  /  ALT  14  /  AlkPhos  98  11-02

## 2021-11-03 NOTE — PHYSICAL THERAPY INITIAL EVALUATION ADULT - PERTINENT HX OF CURRENT PROBLEM, REHAB EVAL
89 yo F pmhx DM, HLD presenting to the ED for evaluation of R ankle pain/injury s/p mechanical trip and fall about 1 hour prior to arrival. Pt reports she ambulates with walker at baseline, pt was walking up steps (without walker), twisted her R ankle, inversion injury,  called ems due to inability to ambulate.

## 2021-11-03 NOTE — H&P ADULT - NSICDXPASTSURGICALHX_GEN_ALL_CORE_FT
PAST SURGICAL HISTORY:  H/O Moh's micrographic surgery for skin cancer nose    S/P D&C (status post dilation and curettage)

## 2021-11-03 NOTE — H&P ADULT - NSICDXFAMILYHX_GEN_ALL_CORE_FT
FAMILY HISTORY:  Father  Still living? No  Family history of lung cancer, Age at diagnosis: Age Unknown    Mother  Still living? No  Family history of colon cancer, Age at diagnosis: Age Unknown

## 2021-11-03 NOTE — PHYSICAL THERAPY INITIAL EVALUATION ADULT - GENERAL OBSERVATIONS, REHAB EVAL
Pt encountered in semifowler position in bed in NAD, A & O x 3, +Rt ankle splint intact, and agreeable to participate with PT. Pt requires Min A in bed mobility, Min A in transfer mobility and ambulated 10 ft Min A using RW with PWB RLE. Pt demonstrated limited ambulation secondary inc pain 6/10 Rt ankle. Pt left in bed as found in NAD, RN notified.

## 2021-11-03 NOTE — ED CDU PROVIDER DISPOSITION NOTE - CLINICAL COURSE
pt presented to ed for eval of fall, with right ankle pain. pt found to have  acute, minimally displaced right distal fibular fracture. The patietn was placed in edou for further rosa. The pt was seen by  PT whoand recommended for rehabilitation facility for ambulation training. As a result, patient to be admitted to medicine for further eval and likely rehab placement.

## 2021-11-03 NOTE — H&P ADULT - NSHPPHYSICALEXAM_GEN_ALL_CORE
CONSTITUTIONAL: No acute distress, well-developed, well-groomed, AAOx3  HEAD: Atraumatic, normocephalic  EYES: EOM intact, PERRLA, conjunctiva and sclera clear  ENT: Supple, no masses, no thyromegaly, no bruits, no JVD; moist mucous membranes  PULMONARY: Clear to auscultation bilaterally; no wheezes, rales, or rhonchi  CARDIOVASCULAR: Regular rate and rhythm; no murmurs, rubs, or gallops  GASTROINTESTINAL: Soft, non-tender, non-distended; bowel sounds present  MUSCULOSKELETAL: 2+ peripheral pulses; no clubbing, no cyanosis, no edema  NEUROLOGY: non-focal  SKIN: No rashes or lesions; warm and dry CONSTITUTIONAL: No acute distress, well-developed, well-groomed, AAOx3  HEAD: Atraumatic, normocephalic  EYES: EOM intact, PERRLA, conjunctiva and sclera clear  ENT: Supple, no masses, no thyromegaly, no bruits, no JVD; moist mucous membranes  PULMONARY: Clear to auscultation bilaterally; no wheezes, rales, or rhonchi  CARDIOVASCULAR: Regular rate and rhythm; no murmurs, rubs, or gallops  GASTROINTESTINAL: Soft, non-tender, non-distended; bowel sounds present  MUSCULOSKELETAL: right ankle tender and swollen, exam limited due to soft casting   NEUROLOGY: non-focal  SKIN: No rashes or lesions; warm and dry

## 2021-11-04 LAB
ALBUMIN SERPL ELPH-MCNC: 3.5 G/DL — SIGNIFICANT CHANGE UP (ref 3.5–5.2)
ALP SERPL-CCNC: 95 U/L — SIGNIFICANT CHANGE UP (ref 30–115)
ALT FLD-CCNC: 12 U/L — SIGNIFICANT CHANGE UP (ref 0–41)
ANION GAP SERPL CALC-SCNC: 14 MMOL/L — SIGNIFICANT CHANGE UP (ref 7–14)
AST SERPL-CCNC: 13 U/L — SIGNIFICANT CHANGE UP (ref 0–41)
BASOPHILS # BLD AUTO: 0.02 K/UL — SIGNIFICANT CHANGE UP (ref 0–0.2)
BASOPHILS NFR BLD AUTO: 0.3 % — SIGNIFICANT CHANGE UP (ref 0–1)
BILIRUB SERPL-MCNC: 0.4 MG/DL — SIGNIFICANT CHANGE UP (ref 0.2–1.2)
BUN SERPL-MCNC: 16 MG/DL — SIGNIFICANT CHANGE UP (ref 10–20)
CALCIUM SERPL-MCNC: 8.8 MG/DL — SIGNIFICANT CHANGE UP (ref 8.5–10.1)
CHLORIDE SERPL-SCNC: 99 MMOL/L — SIGNIFICANT CHANGE UP (ref 98–110)
CO2 SERPL-SCNC: 26 MMOL/L — SIGNIFICANT CHANGE UP (ref 17–32)
CREAT SERPL-MCNC: 1 MG/DL — SIGNIFICANT CHANGE UP (ref 0.7–1.5)
EOSINOPHIL # BLD AUTO: 0.15 K/UL — SIGNIFICANT CHANGE UP (ref 0–0.7)
EOSINOPHIL NFR BLD AUTO: 1.9 % — SIGNIFICANT CHANGE UP (ref 0–8)
GLUCOSE BLDC GLUCOMTR-MCNC: 139 MG/DL — HIGH (ref 70–99)
GLUCOSE BLDC GLUCOMTR-MCNC: 168 MG/DL — HIGH (ref 70–99)
GLUCOSE BLDC GLUCOMTR-MCNC: 239 MG/DL — HIGH (ref 70–99)
GLUCOSE BLDC GLUCOMTR-MCNC: 269 MG/DL — HIGH (ref 70–99)
GLUCOSE SERPL-MCNC: 251 MG/DL — HIGH (ref 70–99)
HCT VFR BLD CALC: 38.6 % — SIGNIFICANT CHANGE UP (ref 37–47)
HGB BLD-MCNC: 12.6 G/DL — SIGNIFICANT CHANGE UP (ref 12–16)
IMM GRANULOCYTES NFR BLD AUTO: 1.3 % — HIGH (ref 0.1–0.3)
LYMPHOCYTES # BLD AUTO: 1.76 K/UL — SIGNIFICANT CHANGE UP (ref 1.2–3.4)
LYMPHOCYTES # BLD AUTO: 22.8 % — SIGNIFICANT CHANGE UP (ref 20.5–51.1)
MCHC RBC-ENTMCNC: 27.5 PG — SIGNIFICANT CHANGE UP (ref 27–31)
MCHC RBC-ENTMCNC: 32.6 G/DL — SIGNIFICANT CHANGE UP (ref 32–37)
MCV RBC AUTO: 84.3 FL — SIGNIFICANT CHANGE UP (ref 81–99)
MONOCYTES # BLD AUTO: 0.93 K/UL — HIGH (ref 0.1–0.6)
MONOCYTES NFR BLD AUTO: 12 % — HIGH (ref 1.7–9.3)
NEUTROPHILS # BLD AUTO: 4.76 K/UL — SIGNIFICANT CHANGE UP (ref 1.4–6.5)
NEUTROPHILS NFR BLD AUTO: 61.7 % — SIGNIFICANT CHANGE UP (ref 42.2–75.2)
NRBC # BLD: 0 /100 WBCS — SIGNIFICANT CHANGE UP (ref 0–0)
PLATELET # BLD AUTO: 155 K/UL — SIGNIFICANT CHANGE UP (ref 130–400)
POTASSIUM SERPL-MCNC: 4.3 MMOL/L — SIGNIFICANT CHANGE UP (ref 3.5–5)
POTASSIUM SERPL-SCNC: 4.3 MMOL/L — SIGNIFICANT CHANGE UP (ref 3.5–5)
PROT SERPL-MCNC: 6.6 G/DL — SIGNIFICANT CHANGE UP (ref 6–8)
RBC # BLD: 4.58 M/UL — SIGNIFICANT CHANGE UP (ref 4.2–5.4)
RBC # FLD: 13.7 % — SIGNIFICANT CHANGE UP (ref 11.5–14.5)
SODIUM SERPL-SCNC: 139 MMOL/L — SIGNIFICANT CHANGE UP (ref 135–146)
WBC # BLD: 7.72 K/UL — SIGNIFICANT CHANGE UP (ref 4.8–10.8)
WBC # FLD AUTO: 7.72 K/UL — SIGNIFICANT CHANGE UP (ref 4.8–10.8)

## 2021-11-04 PROCEDURE — 99232 SBSQ HOSP IP/OBS MODERATE 35: CPT

## 2021-11-04 RX ORDER — HYDROMORPHONE HYDROCHLORIDE 2 MG/ML
0.5 INJECTION INTRAMUSCULAR; INTRAVENOUS; SUBCUTANEOUS ONCE
Refills: 0 | Status: DISCONTINUED | OUTPATIENT
Start: 2021-11-04 | End: 2021-11-04

## 2021-11-04 RX ORDER — MORPHINE SULFATE 50 MG/1
2 CAPSULE, EXTENDED RELEASE ORAL ONCE
Refills: 0 | Status: DISCONTINUED | OUTPATIENT
Start: 2021-11-04 | End: 2021-11-04

## 2021-11-04 RX ORDER — AMLODIPINE BESYLATE 2.5 MG/1
5 TABLET ORAL DAILY
Refills: 0 | Status: DISCONTINUED | OUTPATIENT
Start: 2021-11-04 | End: 2021-11-07

## 2021-11-04 RX ORDER — POLYETHYLENE GLYCOL 3350 17 G/17G
17 POWDER, FOR SOLUTION ORAL DAILY
Refills: 0 | Status: DISCONTINUED | OUTPATIENT
Start: 2021-11-04 | End: 2021-11-06

## 2021-11-04 RX ORDER — SENNA PLUS 8.6 MG/1
2 TABLET ORAL AT BEDTIME
Refills: 0 | Status: DISCONTINUED | OUTPATIENT
Start: 2021-11-04 | End: 2021-11-06

## 2021-11-04 RX ORDER — ACETAMINOPHEN 500 MG
650 TABLET ORAL EVERY 6 HOURS
Refills: 0 | Status: DISCONTINUED | OUTPATIENT
Start: 2021-11-04 | End: 2021-11-07

## 2021-11-04 RX ADMIN — HYDROMORPHONE HYDROCHLORIDE 0.5 MILLIGRAM(S): 2 INJECTION INTRAMUSCULAR; INTRAVENOUS; SUBCUTANEOUS at 04:39

## 2021-11-04 RX ADMIN — SENNA PLUS 2 TABLET(S): 8.6 TABLET ORAL at 21:26

## 2021-11-04 RX ADMIN — Medication 88 MICROGRAM(S): at 07:31

## 2021-11-04 RX ADMIN — ATORVASTATIN CALCIUM 20 MILLIGRAM(S): 80 TABLET, FILM COATED ORAL at 21:26

## 2021-11-04 RX ADMIN — HEPARIN SODIUM 5000 UNIT(S): 5000 INJECTION INTRAVENOUS; SUBCUTANEOUS at 17:28

## 2021-11-04 RX ADMIN — HEPARIN SODIUM 5000 UNIT(S): 5000 INJECTION INTRAVENOUS; SUBCUTANEOUS at 07:32

## 2021-11-04 NOTE — OCCUPATIONAL THERAPY INITIAL EVALUATION ADULT - PERTINENT HX OF CURRENT PROBLEM, REHAB EVAL
Patient is a 89 year old female with PMHx of PUD, GERD, Type 2 Diabetes Mellitus, hypothyroidism, and  hyperlipidemia presenting to the ED s/p fall at home. The patient ambulated with a cane at baseline. She was walking up the steps of her home without the walker and twisted her right ankle and fell to the ground. She is unsure how she landed as the incident happened so quickly. Denies head trauma or LOC. Patient  called EMS as the patient was not able to ambulate after the fall.

## 2021-11-04 NOTE — PROGRESS NOTE ADULT - ASSESSMENT
Right Fibular Fracture  - Xray Foot AP + Lateral + Oblique, Right (11.02.21) Acute, minimally displaced right distal fibular fracture.  - Follow up PT consult- will likely need STR   -as per physiatry, pt appropriate candidate for intensive inpatient rehab  - Orthopedics consult: recommendations on weight bearing status and need for any intervention/ortho follow up  - pain control with Tylenol PRN     HTN  - hypertensive urgency in the ED resolved without administration of anti-hypertensives  - monitor BP, start antihypertensives if needed    Hypothyroidism   - c/w home dose levothyroxine 88 mcg     Diabetes Mellitus Type 2   - home medication: metformin 500 mg BID   - hold oral medication while inpatient   - monitor FSG qACHS, maintain <180     GERD  - c/w pantoprazole 40 mg qd     HLD  - c/w home medications: atorvastatin 20 mg       - DVT Prophylaxis: heparin subq  - GI Prophylaxis: PPI   - Diet: DASH/TLC/CC  - Activity: IAT   - Code Status: Full code   - Dispo: as per physiatry, pt needs inpatient rehab    need to confirm medrec Right Fibular Fracture  - Xray Foot AP + Lateral + Oblique, Right (11.02.21) Acute, minimally displaced right distal fibular fracture.  - Follow up PT consult- will likely need STR   -as per physiatry, pt appropriate candidate for intensive inpatient rehab  - Orthopedics consult: recommendations on weight bearing status and need for any intervention/ortho follow up  - pain control with Tylenol PRN     HTN  - hypertensive urgency in the ED resolved without administration of anti-hypertensives  - monitor BP, start antihypertensives if needed    Hypothyroidism   - c/w home dose levothyroxine 88 mcg     Diabetes Mellitus Type 2   - home medication: metformin 500 mg BID   - hold oral medication while inpatient   - monitor FSG qACHS, maintain <180     GERD  - c/w pantoprazole 40 mg qd     HLD  - c/w home medications: atorvastatin 20 mg       - DVT Prophylaxis: heparin subq  - GI Prophylaxis: PPI   - Diet: DASH/TLC/CC  - Activity: IAT   - Code Status: Full code   - Dispo: as per physiatry, pt needs inpatient rehab    *Unable to reach pt's daughter Bentley this morning to confirm medrec; will attempt again later today*

## 2021-11-04 NOTE — PROGRESS NOTE ADULT - ASSESSMENT
#Right Fibular Fracture  wrapped lower extremity   PT    #HTN  BP: 134/60 (04 Nov 2021 09:15) (134/60 - 200/81)  controlled , earlier elevated bp     #Hypothyroidism  on levothyroxine     #Diabetes Mellitus Type 2   POCT Blood Glucose.: 269 mg/dL (04 Nov 2021 11:18)  POCT Blood Glucose.: 139 mg/dL (04 Nov 2021 08:06)  POCT Blood Glucose.: 160 mg/dL (03 Nov 2021 22:02)  POCT Blood Glucose.: 207 mg/dL (03 Nov 2021 16:20)  controlled    #GERD  on ppi    #HLD  statin     PROGRESS NOTE HANDOFF    Pending: pt planning     Family discussion:[phone] called at 2:14 PM 11/4/2021  busy  no answer     Disposition:  snf

## 2021-11-05 ENCOUNTER — TRANSCRIPTION ENCOUNTER (OUTPATIENT)
Age: 86
End: 2021-11-05

## 2021-11-05 LAB
ALBUMIN SERPL ELPH-MCNC: 3.1 G/DL — LOW (ref 3.5–5.2)
ALP SERPL-CCNC: 93 U/L — SIGNIFICANT CHANGE UP (ref 30–115)
ALT FLD-CCNC: 11 U/L — SIGNIFICANT CHANGE UP (ref 0–41)
ANION GAP SERPL CALC-SCNC: 16 MMOL/L — HIGH (ref 7–14)
AST SERPL-CCNC: 13 U/L — SIGNIFICANT CHANGE UP (ref 0–41)
BILIRUB SERPL-MCNC: 0.4 MG/DL — SIGNIFICANT CHANGE UP (ref 0.2–1.2)
BUN SERPL-MCNC: 17 MG/DL — SIGNIFICANT CHANGE UP (ref 10–20)
CALCIUM SERPL-MCNC: 8.6 MG/DL — SIGNIFICANT CHANGE UP (ref 8.5–10.1)
CHLORIDE SERPL-SCNC: 101 MMOL/L — SIGNIFICANT CHANGE UP (ref 98–110)
CO2 SERPL-SCNC: 22 MMOL/L — SIGNIFICANT CHANGE UP (ref 17–32)
COVID-19 NUCLEOCAPSID GAM AB INTERP: NEGATIVE — SIGNIFICANT CHANGE UP
COVID-19 NUCLEOCAPSID TOTAL GAM ANTIBODY RESULT: 0.08 INDEX — SIGNIFICANT CHANGE UP
COVID-19 SPIKE DOMAIN AB INTERP: POSITIVE
COVID-19 SPIKE DOMAIN ANTIBODY RESULT: >250 U/ML — HIGH
CREAT SERPL-MCNC: 1.1 MG/DL — SIGNIFICANT CHANGE UP (ref 0.7–1.5)
GLUCOSE BLDC GLUCOMTR-MCNC: 252 MG/DL — HIGH (ref 70–99)
GLUCOSE BLDC GLUCOMTR-MCNC: 275 MG/DL — HIGH (ref 70–99)
GLUCOSE BLDC GLUCOMTR-MCNC: 288 MG/DL — HIGH (ref 70–99)
GLUCOSE BLDC GLUCOMTR-MCNC: 291 MG/DL — HIGH (ref 70–99)
GLUCOSE BLDC GLUCOMTR-MCNC: 301 MG/DL — HIGH (ref 70–99)
GLUCOSE SERPL-MCNC: 217 MG/DL — HIGH (ref 70–99)
HCT VFR BLD CALC: 35.9 % — LOW (ref 37–47)
HGB BLD-MCNC: 11.8 G/DL — LOW (ref 12–16)
MAGNESIUM SERPL-MCNC: 1.7 MG/DL — LOW (ref 1.8–2.4)
MCHC RBC-ENTMCNC: 27.5 PG — SIGNIFICANT CHANGE UP (ref 27–31)
MCHC RBC-ENTMCNC: 32.9 G/DL — SIGNIFICANT CHANGE UP (ref 32–37)
MCV RBC AUTO: 83.7 FL — SIGNIFICANT CHANGE UP (ref 81–99)
NRBC # BLD: 0 /100 WBCS — SIGNIFICANT CHANGE UP (ref 0–0)
PLATELET # BLD AUTO: 99 K/UL — LOW (ref 130–400)
POTASSIUM SERPL-MCNC: 4.6 MMOL/L — SIGNIFICANT CHANGE UP (ref 3.5–5)
POTASSIUM SERPL-SCNC: 4.6 MMOL/L — SIGNIFICANT CHANGE UP (ref 3.5–5)
PROT SERPL-MCNC: 6.2 G/DL — SIGNIFICANT CHANGE UP (ref 6–8)
RBC # BLD: 4.29 M/UL — SIGNIFICANT CHANGE UP (ref 4.2–5.4)
RBC # FLD: 13.8 % — SIGNIFICANT CHANGE UP (ref 11.5–14.5)
SARS-COV-2 IGG+IGM SERPL QL IA: 0.08 INDEX — SIGNIFICANT CHANGE UP
SARS-COV-2 IGG+IGM SERPL QL IA: >250 U/ML — HIGH
SARS-COV-2 IGG+IGM SERPL QL IA: NEGATIVE — SIGNIFICANT CHANGE UP
SARS-COV-2 IGG+IGM SERPL QL IA: POSITIVE
SARS-COV-2 RNA SPEC QL NAA+PROBE: SIGNIFICANT CHANGE UP
SODIUM SERPL-SCNC: 139 MMOL/L — SIGNIFICANT CHANGE UP (ref 135–146)
WBC # BLD: 8.68 K/UL — SIGNIFICANT CHANGE UP (ref 4.8–10.8)
WBC # FLD AUTO: 8.68 K/UL — SIGNIFICANT CHANGE UP (ref 4.8–10.8)

## 2021-11-05 PROCEDURE — 99233 SBSQ HOSP IP/OBS HIGH 50: CPT

## 2021-11-05 RX ORDER — AMLODIPINE BESYLATE 2.5 MG/1
1 TABLET ORAL
Qty: 30 | Refills: 0
Start: 2021-11-05 | End: 2021-12-04

## 2021-11-05 RX ORDER — MAGNESIUM SULFATE 500 MG/ML
2 VIAL (ML) INJECTION ONCE
Refills: 0 | Status: COMPLETED | OUTPATIENT
Start: 2021-11-05 | End: 2021-11-05

## 2021-11-05 RX ADMIN — Medication 88 MICROGRAM(S): at 05:58

## 2021-11-05 RX ADMIN — Medication 50 GRAM(S): at 15:28

## 2021-11-05 RX ADMIN — ATORVASTATIN CALCIUM 20 MILLIGRAM(S): 80 TABLET, FILM COATED ORAL at 21:11

## 2021-11-05 RX ADMIN — SENNA PLUS 2 TABLET(S): 8.6 TABLET ORAL at 21:11

## 2021-11-05 RX ADMIN — AMLODIPINE BESYLATE 5 MILLIGRAM(S): 2.5 TABLET ORAL at 05:58

## 2021-11-05 RX ADMIN — POLYETHYLENE GLYCOL 3350 17 GRAM(S): 17 POWDER, FOR SOLUTION ORAL at 13:15

## 2021-11-05 RX ADMIN — HEPARIN SODIUM 5000 UNIT(S): 5000 INJECTION INTRAVENOUS; SUBCUTANEOUS at 05:58

## 2021-11-05 RX ADMIN — HEPARIN SODIUM 5000 UNIT(S): 5000 INJECTION INTRAVENOUS; SUBCUTANEOUS at 18:14

## 2021-11-05 NOTE — DISCHARGE NOTE PROVIDER - HOSPITAL COURSE
Patient is a 89 year old female with PMHx of PUD, GERD, Type 2 Diabetes Mellitus, hypothyroidism, and  hyperlipidemia presenting to the ED s/p fall at home. The patient ambulated with a walker at baseline. She was walking up the steps of her home without the walker and twisted her right ankle and fell to the ground. She is unsure how she landed as the incident happened so quickly. Denies head trauma or LOC. Patient  called EMS as the patient was not able to ambulate after the fall.  Patient denies hip/back/neck pain, denies any further trauma, syncope, chest pain, dizziness, shortness of breath, nausea/vomiting, or abdominal pain.     In the ED, patient was hypertensive to 206/93. BP improved without administration of antihypertensive agents, now 115/71. XR imaging reveals acute, minimally displaced right distal fibular fracture. Patient was seen by PT in the ED and recommended for rehabilitation facility for ambulation training. Patient admitted to rehab for further management.     PT consulted and recommended discharge to rehab facility with rolling walker. *pending ortho f/u* Patient is a 89 year old female with PMHx of PUD, GERD, Type 2 Diabetes Mellitus, hypothyroidism, and  hyperlipidemia presenting to the ED s/p fall at home. The patient ambulated with a walker at baseline. She was walking up the steps of her home without the walker and twisted her right ankle and fell to the ground. She is unsure how she landed as the incident happened so quickly. Denies head trauma or LOC. Patient  called EMS as the patient was not able to ambulate after the fall.  Patient denies hip/back/neck pain, denies any further trauma, syncope, chest pain, dizziness, shortness of breath, nausea/vomiting, or abdominal pain.     In the ED, patient was hypertensive to 206/93. BP improved without administration of antihypertensive agents, now 115/71. XR imaging reveals acute, minimally displaced right distal fibular fracture. Patient was seen by PT in the ED and recommended for rehabilitation facility for ambulation training. Patient admitted to rehab for further management.     PT consulted and recommended discharge to rehab facility with rolling walker. *pending ortho f/u*                              12.3   7.32  )-----------( 152      ( 06 Nov 2021 09:00 )             37.6   11-06    136  |  99  |  16  ----------------------------<  291<H>  4.4   |  19  |  0.9    Ca    9.3      06 Nov 2021 09:00  Mg     2.1     11-06    TPro  7.0  /  Alb  3.6  /  TBili  0.5  /  DBili  x   /  AST  45<H>  /  ALT  31  /  AlkPhos  103  11-06

## 2021-11-05 NOTE — DISCHARGE NOTE PROVIDER - NSDCMRMEDTOKEN_GEN_ALL_CORE_FT
acetaminophen 325 mg oral tablet: 2 tab(s) orally every 6 hours, As needed, Mild Pain  atorvastatin 20 mg oral tablet: 1 tab(s) orally once a day (at bedtime)  levothyroxine 88 mcg (0.088 mg) oral tablet: 1 tab(s) orally once a day  metFORMIN 500 mg oral tablet: 1 tab(s) orally 2 times a day  pantoprazole 40 mg oral delayed release tablet: 1 tab(s) orally once a day   acetaminophen 325 mg oral tablet: 2 tab(s) orally every 6 hours, As needed, Mild Pain  atorvastatin 20 mg oral tablet: 1 tab(s) orally once a day (at bedtime)  levothyroxine 88 mcg (0.088 mg) oral tablet: 1 tab(s) orally once a day  metFORMIN 500 mg oral tablet: 1 tab(s) orally 2 times a day  Norvasc 5 mg oral tablet: 1 tab(s) orally once a day  pantoprazole 40 mg oral delayed release tablet: 1 tab(s) orally once a day

## 2021-11-05 NOTE — DISCHARGE NOTE PROVIDER - CARE PROVIDER_API CALL
Ellen Blancas)  Internal Medicine  [address] [place] [address]  Phone: [phone]  Fax: [phone]  Follow Up Time: 1 week

## 2021-11-05 NOTE — PROGRESS NOTE ADULT - ASSESSMENT
Right Fibular Fracture  - Xray Foot AP + Lateral + Oblique, Right (11.02.21) Acute, minimally displaced right distal fibular fracture.  - Follow up PT consult- will likely need STR   -as per physiatry, pt appropriate candidate for intensive inpatient rehab  - Orthopedics consult: recommendations on weight bearing status and need for any intervention/ortho follow up  - OT consult--> will need to be discharged to rehab facility    HTN  - hypertensive urgency in the ED resolved without administration of anti-hypertensives  - started on amlodipine 5mg    Hypothyroidism   - c/w home dose levothyroxine 88 mcg     Diabetes Mellitus Type 2   - home medication: metformin 500 mg BID   - hold oral medication while inpatient   - monitor FSG qACHS, maintain <180     GERD  - c/w pantoprazole 40 mg qd     HLD  - c/w home medications: atorvastatin 20 mg       - DVT Prophylaxis: heparin subq  - GI Prophylaxis: PPI   - Diet: DASH/TLC/CC  - Activity: IAT   - Code Status: Full code   - Dispo: as per physiatry, pt needs inpatient rehab

## 2021-11-05 NOTE — PROGRESS NOTE ADULT - ASSESSMENT
#Right Fibular Fracture  wrapped lower extremity   PT    #HTN  BP: 154/67 (05 Nov 2021 07:28) (137/61 - 188/77)  controlled    #Hypothyroidism  on levothyroxine     #Diabetes Mellitus Type 2   POCT Blood Glucose.: 275 mg/dL (05 Nov 2021 11:15)  POCT Blood Glucose.: 252 mg/dL (05 Nov 2021 07:44)  POCT Blood Glucose.: 239 mg/dL (04 Nov 2021 21:12)  POCT Blood Glucose.: 168 mg/dL (04 Nov 2021 16:46)  last reading elevated will monitor   #GERD  on ppi    #HLD  statin     PROGRESS NOTE HANDOFF    Pending: dc planning     Family discussion: [phone] called at 2:02 PM 11/4/2021  busy  no answer     Disposition:  snf  #Right Fibular Fracture  wrapped lower extremity   PT    #HTN  BP: 154/67 (05 Nov 2021 07:28) (137/61 - 188/77)  controlled    #Hypomagnesemia replete     #Hypothyroidism  on levothyroxine     #Diabetes Mellitus Type 2   POCT Blood Glucose.: 275 mg/dL (05 Nov 2021 11:15)  POCT Blood Glucose.: 252 mg/dL (05 Nov 2021 07:44)  POCT Blood Glucose.: 239 mg/dL (04 Nov 2021 21:12)  POCT Blood Glucose.: 168 mg/dL (04 Nov 2021 16:46)  last reading elevated will monitor   #GERD  on ppi    #HLD  statin     PROGRESS NOTE HANDOFF    Pending: dc planning     Family discussion: [phone] called at 2:02 PM 11/4/2021  busy  no answer     Disposition:  snf

## 2021-11-05 NOTE — DISCHARGE NOTE PROVIDER - NSDCCPCAREPLAN_GEN_ALL_CORE_FT
PRINCIPAL DISCHARGE DIAGNOSIS  Diagnosis: Fibula fracture  Assessment and Plan of Treatment: You had a fall and sustained a fracture. Imaging on admission showed a fracture near the foot and the physical therpaists recommended home care with physical therapy.  Please use your walker as instructed. Please see your primary care doctor for further care and management.  SEEK IMMEDIATE MEDICAL CARE IF YOU HAVE ANY OF THE FOLLOWING SYMPTOMS: chest pain, shortness of breath, abdominal pain, sweating, vomiting, blood in vomit/bowel movements/urine, dizziness/lightheadedness, weakness or numbness to face/arm/leg, trouble speaking or understanding, facial droop.

## 2021-11-06 ENCOUNTER — TRANSCRIPTION ENCOUNTER (OUTPATIENT)
Age: 86
End: 2021-11-06

## 2021-11-06 LAB
ALBUMIN SERPL ELPH-MCNC: 3.6 G/DL — SIGNIFICANT CHANGE UP (ref 3.5–5.2)
ALP SERPL-CCNC: 103 U/L — SIGNIFICANT CHANGE UP (ref 30–115)
ALT FLD-CCNC: 31 U/L — SIGNIFICANT CHANGE UP (ref 0–41)
ANION GAP SERPL CALC-SCNC: 18 MMOL/L — HIGH (ref 7–14)
AST SERPL-CCNC: 45 U/L — HIGH (ref 0–41)
BILIRUB SERPL-MCNC: 0.5 MG/DL — SIGNIFICANT CHANGE UP (ref 0.2–1.2)
BUN SERPL-MCNC: 16 MG/DL — SIGNIFICANT CHANGE UP (ref 10–20)
C DIFF BY PCR RESULT: NEGATIVE — SIGNIFICANT CHANGE UP
C DIFF TOX GENS STL QL NAA+PROBE: SIGNIFICANT CHANGE UP
CALCIUM SERPL-MCNC: 9.3 MG/DL — SIGNIFICANT CHANGE UP (ref 8.5–10.1)
CHLORIDE SERPL-SCNC: 99 MMOL/L — SIGNIFICANT CHANGE UP (ref 98–110)
CO2 SERPL-SCNC: 19 MMOL/L — SIGNIFICANT CHANGE UP (ref 17–32)
CREAT SERPL-MCNC: 0.9 MG/DL — SIGNIFICANT CHANGE UP (ref 0.7–1.5)
GLUCOSE BLDC GLUCOMTR-MCNC: 253 MG/DL — HIGH (ref 70–99)
GLUCOSE BLDC GLUCOMTR-MCNC: 257 MG/DL — HIGH (ref 70–99)
GLUCOSE BLDC GLUCOMTR-MCNC: 292 MG/DL — HIGH (ref 70–99)
GLUCOSE BLDC GLUCOMTR-MCNC: 376 MG/DL — HIGH (ref 70–99)
GLUCOSE SERPL-MCNC: 291 MG/DL — HIGH (ref 70–99)
HCT VFR BLD CALC: 37.6 % — SIGNIFICANT CHANGE UP (ref 37–47)
HGB BLD-MCNC: 12.3 G/DL — SIGNIFICANT CHANGE UP (ref 12–16)
MAGNESIUM SERPL-MCNC: 2.1 MG/DL — SIGNIFICANT CHANGE UP (ref 1.8–2.4)
MCHC RBC-ENTMCNC: 27.5 PG — SIGNIFICANT CHANGE UP (ref 27–31)
MCHC RBC-ENTMCNC: 32.7 G/DL — SIGNIFICANT CHANGE UP (ref 32–37)
MCV RBC AUTO: 83.9 FL — SIGNIFICANT CHANGE UP (ref 81–99)
NRBC # BLD: 0 /100 WBCS — SIGNIFICANT CHANGE UP (ref 0–0)
PLATELET # BLD AUTO: 152 K/UL — SIGNIFICANT CHANGE UP (ref 130–400)
POTASSIUM SERPL-MCNC: 4.4 MMOL/L — SIGNIFICANT CHANGE UP (ref 3.5–5)
POTASSIUM SERPL-SCNC: 4.4 MMOL/L — SIGNIFICANT CHANGE UP (ref 3.5–5)
PROT SERPL-MCNC: 7 G/DL — SIGNIFICANT CHANGE UP (ref 6–8)
RBC # BLD: 4.48 M/UL — SIGNIFICANT CHANGE UP (ref 4.2–5.4)
RBC # FLD: 13.8 % — SIGNIFICANT CHANGE UP (ref 11.5–14.5)
SODIUM SERPL-SCNC: 136 MMOL/L — SIGNIFICANT CHANGE UP (ref 135–146)
WBC # BLD: 7.32 K/UL — SIGNIFICANT CHANGE UP (ref 4.8–10.8)
WBC # FLD AUTO: 7.32 K/UL — SIGNIFICANT CHANGE UP (ref 4.8–10.8)

## 2021-11-06 PROCEDURE — 99232 SBSQ HOSP IP/OBS MODERATE 35: CPT

## 2021-11-06 RX ORDER — GLUCAGON INJECTION, SOLUTION 0.5 MG/.1ML
1 INJECTION, SOLUTION SUBCUTANEOUS ONCE
Refills: 0 | Status: DISCONTINUED | OUTPATIENT
Start: 2021-11-06 | End: 2021-11-06

## 2021-11-06 RX ORDER — SODIUM CHLORIDE 9 MG/ML
1000 INJECTION, SOLUTION INTRAVENOUS
Refills: 0 | Status: DISCONTINUED | OUTPATIENT
Start: 2021-11-06 | End: 2021-11-06

## 2021-11-06 RX ORDER — HYDROXYZINE HCL 10 MG
25 TABLET ORAL ONCE
Refills: 0 | Status: COMPLETED | OUTPATIENT
Start: 2021-11-06 | End: 2021-11-06

## 2021-11-06 RX ORDER — DEXTROSE 50 % IN WATER 50 %
25 SYRINGE (ML) INTRAVENOUS ONCE
Refills: 0 | Status: DISCONTINUED | OUTPATIENT
Start: 2021-11-06 | End: 2021-11-06

## 2021-11-06 RX ORDER — DEXTROSE 50 % IN WATER 50 %
15 SYRINGE (ML) INTRAVENOUS ONCE
Refills: 0 | Status: DISCONTINUED | OUTPATIENT
Start: 2021-11-06 | End: 2021-11-06

## 2021-11-06 RX ORDER — METFORMIN HYDROCHLORIDE 850 MG/1
500 TABLET ORAL
Refills: 0 | Status: DISCONTINUED | OUTPATIENT
Start: 2021-11-06 | End: 2021-11-07

## 2021-11-06 RX ORDER — DEXTROSE 50 % IN WATER 50 %
12.5 SYRINGE (ML) INTRAVENOUS ONCE
Refills: 0 | Status: DISCONTINUED | OUTPATIENT
Start: 2021-11-06 | End: 2021-11-06

## 2021-11-06 RX ADMIN — HEPARIN SODIUM 5000 UNIT(S): 5000 INJECTION INTRAVENOUS; SUBCUTANEOUS at 18:09

## 2021-11-06 RX ADMIN — ATORVASTATIN CALCIUM 20 MILLIGRAM(S): 80 TABLET, FILM COATED ORAL at 21:52

## 2021-11-06 RX ADMIN — AMLODIPINE BESYLATE 5 MILLIGRAM(S): 2.5 TABLET ORAL at 05:28

## 2021-11-06 RX ADMIN — HEPARIN SODIUM 5000 UNIT(S): 5000 INJECTION INTRAVENOUS; SUBCUTANEOUS at 05:28

## 2021-11-06 RX ADMIN — Medication 25 MILLIGRAM(S): at 23:50

## 2021-11-06 RX ADMIN — METFORMIN HYDROCHLORIDE 500 MILLIGRAM(S): 850 TABLET ORAL at 18:09

## 2021-11-06 RX ADMIN — POLYETHYLENE GLYCOL 3350 17 GRAM(S): 17 POWDER, FOR SOLUTION ORAL at 11:58

## 2021-11-06 RX ADMIN — Medication 88 MICROGRAM(S): at 05:28

## 2021-11-06 NOTE — PROGRESS NOTE ADULT - ASSESSMENT
#Right Fibular Fracture  wrapped lower extremity   PT    #HTN  BP: 140/70 (06 Nov 2021 09:30) (132/65 - 156/69)  controlled    #Hypomagnesemia resolved    #Hypothyroidism  on levothyroxine     #Diabetes Mellitus Type 2   CAPILLARY BLOOD GLUCOSE      POCT Blood Glucose.: 257 mg/dL (06 Nov 2021 11:32)  POCT Blood Glucose.: 253 mg/dL (06 Nov 2021 07:25)  POCT Blood Glucose.: 291 mg/dL (05 Nov 2021 21:39)  POCT Blood Glucose.: 301 mg/dL (05 Nov 2021 21:26)  POCT Blood Glucose.: 288 mg/dL (05 Nov 2021 16:57)  controlled    #GERD  on ppi    #HLD  statin     PROGRESS NOTE HANDOFF    Pending: dc planning     Family discussion:  family and partner notified of patient's condition     Disposition:  snf

## 2021-11-06 NOTE — PROGRESS NOTE ADULT - ASSESSMENT
Right Fibular Fracture  - Xray Foot AP + Lateral + Oblique, Right (11.02.21) Acute, minimally displaced right distal fibular fracture.  - Follow up PT consult- will likely need STR   -as per physiatry, pt appropriate candidate for intensive inpatient rehab  - OT consult--> will need to be discharged to rehab facility  -pending placement to rehab facility    HTN  - hypertensive urgency in the ED resolved without administration of anti-hypertensives  - started on amlodipine 5mg    Hypothyroidism   - c/w home dose levothyroxine 88 mcg     Diabetes Mellitus Type 2   - home medication: metformin 500 mg BID   - home metformin started  - monitor FSG qACHS, maintain <180     GERD  - c/w pantoprazole 40 mg qd     HLD  - c/w home medications: atorvastatin 20 mg       - DVT Prophylaxis: heparin subq  - GI Prophylaxis: none  - Diet: consistent carbs  - Activity: IAT   - Code Status: Full code   - Dispo: pending auth for placement in rehab facility   Right Fibular Fracture  - Xray Foot AP + Lateral + Oblique, Right (11.02.21) Acute, minimally displaced right distal fibular fracture.  - Follow up PT consult- will likely need STR   -as per physiatry, pt appropriate candidate for intensive inpatient rehab  - OT consult--> will need to be discharged to rehab facility  -pending placement to rehab facility    HTN  - hypertensive urgency in the ED resolved without administration of anti-hypertensives  - started on amlodipine 5mg    Hypothyroidism   - c/w home dose levothyroxine 88 mcg     Diabetes Mellitus Type 2   - home medication: metformin 500 mg BID   - home metformin started  - monitor FSG qACHS, maintain <180     GERD  - c/w pantoprazole 40 mg qd     HLD  - c/w home medications: atorvastatin 20 mg     Diarrhea  -pt complaining of several episodes of diarrhea overnight  -pt afebrile, no leukocytosis  -can consider ordering C.dif stool Ag if diarrhea persists      - DVT Prophylaxis: heparin subq  - GI Prophylaxis: none  - Diet: consistent carbs  - Activity: IAT   - Code Status: Full code   - Dispo: pending auth for placement in rehab facility

## 2021-11-06 NOTE — DISCHARGE NOTE NURSING/CASE MANAGEMENT/SOCIAL WORK - NSDCPEFALRISK_GEN_ALL_CORE
For information on Fall & injury Prevention, visit [email or web address]
For information on Fall & injury Prevention, visit [email or web address]

## 2021-11-06 NOTE — DISCHARGE NOTE NURSING/CASE MANAGEMENT/SOCIAL WORK - NSDCVIVACCINE_GEN_ALL_CORE_FT
Tdap; 25-Feb-2017 17:33; Evon Padilla (RN); [employer]; [id number]; IntraMuscular; Deltoid Right.; 0.5 milliLiter(s); VIS (VIS Published: 09-May-2013, VIS Presented: 25-Feb-2017);   
Tdap; 25-Feb-2017 17:33; Evon Padilla (RN); [employer]; [id number]; IntraMuscular; Deltoid Right.; 0.5 milliLiter(s); VIS (VIS Published: 09-May-2013, VIS Presented: 25-Feb-2017);

## 2021-11-06 NOTE — DISCHARGE NOTE NURSING/CASE MANAGEMENT/SOCIAL WORK - PATIENT PORTAL LINK FT
You can access the FollowMyHealth Patient Portal offered by [facility] by registering at the following website: [email or web address]. By joining [employer]’s FollowMyHealth portal, you will also be able to view your health information using other applications (apps) compatible with our system.
You can access the FollowMyHealth Patient Portal offered by [facility] by registering at the following website: [email or web address]. By joining [employer]’s FollowMyHealth portal, you will also be able to view your health information using other applications (apps) compatible with our system.

## 2021-11-07 VITALS
TEMPERATURE: 99 F | OXYGEN SATURATION: 95 % | RESPIRATION RATE: 18 BRPM | SYSTOLIC BLOOD PRESSURE: 178 MMHG | DIASTOLIC BLOOD PRESSURE: 72 MMHG | HEART RATE: 99 BPM

## 2021-11-07 LAB
ALBUMIN SERPL ELPH-MCNC: 3.6 G/DL — SIGNIFICANT CHANGE UP (ref 3.5–5.2)
ALP SERPL-CCNC: 106 U/L — SIGNIFICANT CHANGE UP (ref 30–115)
ALT FLD-CCNC: 37 U/L — SIGNIFICANT CHANGE UP (ref 0–41)
ANION GAP SERPL CALC-SCNC: 19 MMOL/L — HIGH (ref 7–14)
AST SERPL-CCNC: 32 U/L — SIGNIFICANT CHANGE UP (ref 0–41)
BILIRUB SERPL-MCNC: 0.6 MG/DL — SIGNIFICANT CHANGE UP (ref 0.2–1.2)
BUN SERPL-MCNC: 17 MG/DL — SIGNIFICANT CHANGE UP (ref 10–20)
CALCIUM SERPL-MCNC: 9 MG/DL — SIGNIFICANT CHANGE UP (ref 8.5–10.1)
CHLORIDE SERPL-SCNC: 99 MMOL/L — SIGNIFICANT CHANGE UP (ref 98–110)
CO2 SERPL-SCNC: 18 MMOL/L — SIGNIFICANT CHANGE UP (ref 17–32)
CREAT SERPL-MCNC: 1.1 MG/DL — SIGNIFICANT CHANGE UP (ref 0.7–1.5)
GLUCOSE BLDC GLUCOMTR-MCNC: 263 MG/DL — HIGH (ref 70–99)
GLUCOSE SERPL-MCNC: 258 MG/DL — HIGH (ref 70–99)
HCT VFR BLD CALC: 38.2 % — SIGNIFICANT CHANGE UP (ref 37–47)
HGB BLD-MCNC: 12.3 G/DL — SIGNIFICANT CHANGE UP (ref 12–16)
MAGNESIUM SERPL-MCNC: 1.7 MG/DL — LOW (ref 1.8–2.4)
MCHC RBC-ENTMCNC: 27.5 PG — SIGNIFICANT CHANGE UP (ref 27–31)
MCHC RBC-ENTMCNC: 32.2 G/DL — SIGNIFICANT CHANGE UP (ref 32–37)
MCV RBC AUTO: 85.3 FL — SIGNIFICANT CHANGE UP (ref 81–99)
NRBC # BLD: 0 /100 WBCS — SIGNIFICANT CHANGE UP (ref 0–0)
PLATELET # BLD AUTO: 138 K/UL — SIGNIFICANT CHANGE UP (ref 130–400)
POTASSIUM SERPL-MCNC: 4.5 MMOL/L — SIGNIFICANT CHANGE UP (ref 3.5–5)
POTASSIUM SERPL-SCNC: 4.5 MMOL/L — SIGNIFICANT CHANGE UP (ref 3.5–5)
PROT SERPL-MCNC: 6.9 G/DL — SIGNIFICANT CHANGE UP (ref 6–8)
RBC # BLD: 4.48 M/UL — SIGNIFICANT CHANGE UP (ref 4.2–5.4)
RBC # FLD: 13.8 % — SIGNIFICANT CHANGE UP (ref 11.5–14.5)
SODIUM SERPL-SCNC: 136 MMOL/L — SIGNIFICANT CHANGE UP (ref 135–146)
WBC # BLD: 11.23 K/UL — HIGH (ref 4.8–10.8)
WBC # FLD AUTO: 11.23 K/UL — HIGH (ref 4.8–10.8)

## 2021-11-07 PROCEDURE — 99232 SBSQ HOSP IP/OBS MODERATE 35: CPT

## 2021-11-07 RX ADMIN — HEPARIN SODIUM 5000 UNIT(S): 5000 INJECTION INTRAVENOUS; SUBCUTANEOUS at 06:48

## 2021-11-07 RX ADMIN — METFORMIN HYDROCHLORIDE 500 MILLIGRAM(S): 850 TABLET ORAL at 06:49

## 2021-11-07 RX ADMIN — Medication 88 MICROGRAM(S): at 06:49

## 2021-11-07 RX ADMIN — AMLODIPINE BESYLATE 5 MILLIGRAM(S): 2.5 TABLET ORAL at 06:48

## 2021-11-07 NOTE — PROGRESS NOTE ADULT - PROVIDER SPECIALTY LIST ADULT
Hospitalist
Internal Medicine
Hospitalist
Hospitalist
Internal Medicine
Hospitalist
Internal Medicine

## 2021-11-07 NOTE — PROVIDER CONTACT NOTE (OTHER) - ACTION/TREATMENT ORDERED:
Provider to resume home metformin
MD Tee made aware. As per MD Tee, "I will order something for her."
MD Power aware. As per MD Power, "I will check her chart and see what I can give."
Provider notified no intervention at this time
Provider aware and stated no intervention at this time
Provider aware.
Provider stated to recheck BP in 1 hr
provider to order Haldol to help with pt's emotional state. provider aware of VS and BG

## 2021-11-07 NOTE — PROVIDER CONTACT NOTE (OTHER) - SITUATION
pt received from ED pt is extremely anxious and stating she feels depressed. pt's BP is 196/91 hr 93
pt's BG is elevated at this time at 207. pt received Metformin this morning
pt's BG is elevated this morning at 253. pt has no insulin coverage ordered
Pt FS is 376 without insulin coverage.
pt remains extremely anxious and inconsolable. pt is yelling out. pt denies pain and keeps reporting that she is worried and upset she is in hospital BP remains elevated at 196/92 HR 95. BS elevated
pt . no insulin coverage
pt's BP repeated after receiving Haldol. pt is slowly calming down, but anxious at times. /84 HR 94
pt's IV was removed when D/C was in place. pt now refusing new IV access  despite teaching and education

## 2021-11-07 NOTE — PROVIDER CONTACT NOTE (OTHER) - REASON
IV refusal
elevated BG
pt FS elevated
BP
pt FS elevated
Elevated BP
elevated BG
pt anxious VS elevated

## 2021-11-07 NOTE — PROGRESS NOTE ADULT - ASSESSMENT
#Right Fibular Fracture  wrapped lower extremity   PT    #HTN  BP: 148/64 (07 Nov 2021 06:47) (126/80 - 156/69)  controlled    #Hypomagnesemia resolved    #Hypothyroidism  on levothyroxine     #Diabetes Mellitus Type 2   POCT Blood Glucose.: 263 mg/dL (07 Nov 2021 07:33)  POCT Blood Glucose.: 376 mg/dL (06 Nov 2021 21:15)  POCT Blood Glucose.: 292 mg/dL (06 Nov 2021 16:29)  POCT Blood Glucose.: 257 mg/dL (06 Nov 2021 11:32)  elevated overnight , metformin restarted yesterday will monitor for now , if remain elevated increase metformin to 750 mg q12h     #GERD  on ppi    #HLD  statin     PROGRESS NOTE HANDOFF    Pending:dc    Family discussion:  family and partner notified of patient's condition and dc plan    Disposition:  snf

## 2021-11-07 NOTE — PROVIDER CONTACT NOTE (OTHER) - DATE AND TIME:
03-Nov-2021 13:00
06-Nov-2021 18:30
07-Nov-2021 21:30
03-Nov-2021 11:10
03-Nov-2021 11:55
03-Nov-2021 16:52
06-Nov-2021 08:48
06-Nov-2021 21:30

## 2021-11-07 NOTE — PROGRESS NOTE ADULT - SUBJECTIVE AND OBJECTIVE BOX
----------Daily Progress Note----------    HISTORY OF PRESENT ILLNESS:  Patient is a 89y old Female who presents with a chief complaint of Fall (05 Nov 2021 13:58)    Currently admitted to medicine with the primary diagnosis of Fibula fracture       Today is hospital day 3d.     INTERVAL HOSPITAL COURSE / OVERNIGHT EVENTS:    Patient was examined and seen at bedside. This morning pt complaining of diarrhea "a few episodes overnight." Denies chest pain, SOB, nausea, vomiting. No other complaints at this time.     Review of Systems: Otherwise unremarkable     <<<<<PAST MEDICAL & SURGICAL HISTORY>>>>>  DM (diabetes mellitus)    GI bleeding    S/P D&amp;C (status post dilation and curettage)    H/O Moh&#x27;s micrographic surgery for skin cancer  nose      ALLERGIES  cephalexin (Swelling)  Cipro (Hives)      Home Medications:  acetaminophen 325 mg oral tablet: 2 tab(s) orally every 6 hours, As needed, Mild Pain (03 Nov 2021 11:16)  atorvastatin 20 mg oral tablet: 1 tab(s) orally once a day (at bedtime) (03 Nov 2021 11:16)  levothyroxine 88 mcg (0.088 mg) oral tablet: 1 tab(s) orally once a day (03 Nov 2021 11:16)  metFORMIN 500 mg oral tablet: 1 tab(s) orally 2 times a day (03 Nov 2021 11:16)  pantoprazole 40 mg oral delayed release tablet: 1 tab(s) orally once a day (03 Nov 2021 11:16)        MEDICATIONS  STANDING MEDICATIONS  amLODIPine   Tablet 5 milliGRAM(s) Oral daily  atorvastatin 20 milliGRAM(s) Oral at bedtime  heparin SubCutaneous Injection - Peds 5000 Unit(s) SubCutaneous every 12 hours  levothyroxine 88 MICROGram(s) Oral daily  metFORMIN 500 milliGRAM(s) Oral two times a day  polyethylene glycol 3350 17 Gram(s) Oral daily  senna 2 Tablet(s) Oral at bedtime    PRN MEDICATIONS  acetaminophen     Tablet .. 650 milliGRAM(s) Oral every 6 hours PRN    VITALS:  T(F): 96.9  HR: 90  BP: 140/70  RR: 18  SpO2: 95%    <<<<<LABS>>>>>                        12.3   7.32  )-----------( 152      ( 06 Nov 2021 09:00 )             37.6     11-06    136  |  99  |  16  ----------------------------<  291<H>  4.4   |  19  |  0.9    Ca    9.3      06 Nov 2021 09:00  Mg     2.1     11-06    TPro  7.0  /  Alb  3.6  /  TBili  0.5  /  DBili  x   /  AST  45<H>  /  ALT  31  /  AlkPhos  103  11-06            [id number]        <<<<<RADIOLOGY>>>>>    < from: Xray Foot AP + Lateral + Oblique, Right (11.02.21 @ 18:29) >    EXAM:  XR FOOT COMP MIN 3 VIEWS RT            PROCEDURE DATE:  11/02/2021            INTERPRETATION:  Clinical History / Reason for exam: Foot injury.    Technique:  Three  radiographs of the right foot are obtained.    Comparison:  No studies are available for comparison.    Findings:  Acute, minimally displaced right distal fibular fracture..  Lisfranc joint is intact. Degenerative change of interphalangeal joints.    Impression:  Acute, minimally displaced right distal fibular fracture.    < end of copied text >    < from: Xray Tibia + Fibula 2 Views, Right (11.02.21 @ 18:28) >  EXAM:  XR TIB FIB AP LAT 2 VIEWS RT            PROCEDURE DATE:  11/02/2021            INTERPRETATION:  Clinical History / Reason for exam: Leg injury.    Technique:  2 radiographs of the right tibia and fibula are obtained.    Comparison:  No studies are available for comparison.    Findings:  Acute, minimally displaced right distal fibular fracture.      Impression:  Acute, minimally displaced right distal fibular fracture.    < end of copied text >    <<<<<PHYSICAL EXAM>>>>>  GENERAL: NAD, resting in bed comfortably  PULMONARY: Clear to auscultation bilaterally. No rales, rhonchi, or wheezing.  CARDIOVASCULAR: Regular rate and rhythm, S1-S2, no murmurs  GASTROINTESTINAL: Soft, non-tender, non-distended, no guarding.  SKIN/EXTREMITIES: right foot wrapped with ACE wrap  NEUROLOGIC: AAOX3        -----------------------------------------------------------------------------------------------------------------------------------------------------------------------------------------------
  YAIR PEARL  89y  [facility] F3-4B 0[address] A      Patient is a 89y old  Female who presents with a chief complaint of Fall (05 Nov 2021 11:42)      INTERVAL HPI/OVERNIGHT EVENTS:    no acute events overnight     REVIEW OF SYSTEMS:  CONSTITUTIONAL: No fever, weight loss, or fatigue  EYES: No eye pain, visual disturbances, or discharge  ENMT:  No difficulty hearing, tinnitus, vertigo; No sinus or throat pain  NECK: No pain or stiffness  BREASTS: No pain, masses, or nipple discharge  RESPIRATORY: No cough, wheezing, chills or hemoptysis; No shortness of breath  CARDIOVASCULAR: No chest pain, palpitations, dizziness, or leg swelling  GASTROINTESTINAL: No abdominal or epigastric pain. No nausea, vomiting, or hematemesis; No diarrhea or constipation. No melena or hematochezia.  GENITOURINARY: No dysuria, frequency, hematuria, or incontinence  NEUROLOGICAL: No headaches, memory loss, loss of strength, numbness, or tremors  SKIN: No itching, burning, rashes, or lesions   LYMPH NODES: No enlarged glands  ENDOCRINE: No heat or cold intolerance; No hair loss  MUSCULOSKELETAL: No joint pain or swelling; No muscle, back, or extremity pain  PSYCHIATRIC: No depression, anxiety, mood swings, or difficulty sleeping  HEME/LYMPH: No easy bruising, or bleeding gums  ALLERY AND IMMUNOLOGIC: No hives or eczema  FAMILY HISTORY:  Family history of colon cancer (Mother)    Family history of lung cancer (Father)      T(C): 35.7 (11-05-21 @ 07:28), Max: 36.6 (11-05-21 @ 00:00)  HR: 98 (11-05-21 @ 07:28) (70 - 98)  BP: 154/67 (11-05-21 @ 07:28) (137/61 - 188/77)  RR: 18 (11-05-21 @ 07:28) (18 - 18)  SpO2: 95% (11-05-21 @ 07:28) (95% - 95%)  Wt(kg): --Vital Signs Last 24 Hrs  T(C): 35.7 (05 Nov 2021 07:28), Max: 36.6 (05 Nov 2021 00:00)  T(F): 96.2 (05 Nov 2021 07:28), Max: 97.8 (05 Nov 2021 00:00)  HR: 98 (05 Nov 2021 07:28) (70 - 98)  BP: 154/67 (05 Nov 2021 07:28) (137/61 - 188/77)  BP(mean): --  RR: 18 (05 Nov 2021 07:28) (18 - 18)  SpO2: 95% (05 Nov 2021 07:28) (95% - 95%)    PHYSICAL EXAM:  GENERAL: NAD, well-groomed, well-developed  HEAD:  Atraumatic, Normocephalic  EYES: EOMI, PERRLA, conjunctiva and sclera clear  ENMT: No tonsillar erythema, exudates, or enlargement; Moist mucous membranes, Good dentition, No lesions  NECK: Supple, No JVD, Normal thyroid  NERVOUS SYSTEM:  Alert & Oriented X3, Good concentration; Motor Strength 5/5 B/L upper and lower extremities; DTRs 2+ intact and symmetric  PULM: Clear to auscultation bilaterally  CARDIAC: Regular rate and rhythm; No murmurs, rubs, or gallops  GI: Soft, Nontender, Nondistended; Bowel sounds present  EXTREMITIES:   Right fibular fracture  LYMPH: No lymphadenopathy noted  SKIN: No rashes or lesions    Consultant(s) Notes Reviewed:  [x ] YES  [ ] NO  Care Discussed with Consultants/Other Providers [ x] YES  [ ] NO    LABS:                            11.8   8.68  )-----------( 99       ( 05 Nov 2021 06:21 )             35.9   11-05    139  |  101  |  17  ----------------------------<  217<H>  4.6   |  22  |  1.1    Ca    8.6      05 Nov 2021 06:21  Mg     1.7     11-05    TPro  6.2  /  Alb  3.1<L>  /  TBili  0.4  /  DBili  x   /  AST  13  /  ALT  11  /  AlkPhos  93  11-05            acetaminophen     Tablet .. 650 milliGRAM(s) Oral every 6 hours PRN  amLODIPine   Tablet 5 milliGRAM(s) Oral daily  atorvastatin 20 milliGRAM(s) Oral at bedtime  heparin SubCutaneous Injection - Peds 5000 Unit(s) SubCutaneous every 12 hours  levothyroxine 88 MICROGram(s) Oral daily  polyethylene glycol 3350 17 Gram(s) Oral daily  senna 2 Tablet(s) Oral at bedtime      HEALTH ISSUES - PROBLEM Dx:          Case Discussed with House Staff     Spectra [phone]  
  YAIR PEARL  89y  [facility] F3-4B 033 A      Patient is a 89y old  Female who presents with a chief complaint of Fall (06 Nov 2021 12:12)      INTERVAL HPI/OVERNIGHT EVENTS:    no acute events overnight , transport to nursing facility could not pick patient up     REVIEW OF SYSTEMS:  CONSTITUTIONAL: No fever, weight loss, or fatigue  EYES: No eye pain, visual disturbances, or discharge  ENMT:  No difficulty hearing, tinnitus, vertigo; No sinus or throat pain  NECK: No pain or stiffness  BREASTS: No pain, masses, or nipple discharge  RESPIRATORY: No cough, wheezing, chills or hemoptysis; No shortness of breath  CARDIOVASCULAR: No chest pain, palpitations, dizziness, or leg swelling  GASTROINTESTINAL: No abdominal or epigastric pain. No nausea, vomiting, or hematemesis; No diarrhea or constipation. No melena or hematochezia.  GENITOURINARY: No dysuria, frequency, hematuria, or incontinence  NEUROLOGICAL: No headaches, memory loss, loss of strength, numbness, or tremors  SKIN: No itching, burning, rashes, or lesions   LYMPH NODES: No enlarged glands  ENDOCRINE: No heat or cold intolerance; No hair loss  MUSCULOSKELETAL: No joint pain or swelling; No muscle, back, or extremity pain  PSYCHIATRIC: No depression, anxiety, mood swings, or difficulty sleeping  HEME/LYMPH: No easy bruising, or bleeding gums  ALLERY AND IMMUNOLOGIC: No hives or eczema  FAMILY HISTORY:  Family history of colon cancer (Mother)    Family history of lung cancer (Father)      T(C): 36.2 (11-07-21 @ 00:00), Max: 36.3 (11-06-21 @ 16:01)  HR: 95 (11-07-21 @ 06:47) (90 - 99)  BP: 148/64 (11-07-21 @ 06:47) (126/80 - 156/69)  RR: 18 (11-07-21 @ 00:00) (18 - 18)  SpO2: --  Wt(kg): --Vital Signs Last 24 Hrs  T(C): 36.2 (07 Nov 2021 00:00), Max: 36.3 (06 Nov 2021 16:01)  T(F): 97.2 (07 Nov 2021 00:00), Max: 97.4 (06 Nov 2021 16:01)  HR: 95 (07 Nov 2021 06:47) (90 - 99)  BP: 148/64 (07 Nov 2021 06:47) (126/80 - 156/69)  BP(mean): --  RR: 18 (07 Nov 2021 00:00) (18 - 18)  SpO2: --    PHYSICAL EXAM:  GENERAL: NAD, well-groomed, well-developed  HEAD:  Atraumatic, Normocephalic  EYES: EOMI, PERRLA, conjunctiva and sclera clear  ENMT: No tonsillar erythema, exudates, or enlargement; Moist mucous membranes, Good dentition, No lesions  NECK: Supple, No JVD, Normal thyroid  PULM: Clear to auscultation bilaterally  CARDIAC: Regular rate and rhythm; No murmurs, rubs, or gallops  GI: Soft, Nontender, Nondistended; Bowel sounds present  EXTREMITIES:  RLE wrapped  LYMPH: No lymphadenopathy noted  SKIN: No rashes or lesions    Consultant(s) Notes Reviewed:  [x ] YES  [ ] NO  Care Discussed with Consultants/Other Providers [ x] YES  [ ] NO    LABS:                            12.3   7.32  )-----------( 152      ( 06 Nov 2021 09:00 )             37.6   11-06    136  |  99  |  16  ----------------------------<  291<H>  4.4   |  19  |  0.9    Ca    9.3      06 Nov 2021 09:00  Mg     2.1     11-06    TPro  7.0  /  Alb  3.6  /  TBili  0.5  /  DBili  x   /  AST  45<H>  /  ALT  31  /  AlkPhos  103  11-06            acetaminophen     Tablet .. 650 milliGRAM(s) Oral every 6 hours PRN  amLODIPine   Tablet 5 milliGRAM(s) Oral daily  atorvastatin 20 milliGRAM(s) Oral at bedtime  heparin SubCutaneous Injection - Peds 5000 Unit(s) SubCutaneous every 12 hours  levothyroxine 88 MICROGram(s) Oral daily  metFORMIN 500 milliGRAM(s) Oral two times a day      HEALTH ISSUES - PROBLEM Dx:          Case Discussed with House Staff   Spectra [phone]  
----------Daily Progress Note----------    HISTORY OF PRESENT ILLNESS:  Patient is a 89y old Female who presents with a chief complaint of Fall (04 Nov 2021 14:06)    Currently admitted to medicine with the primary diagnosis of Fibula fracture       Today is hospital day 2d.     INTERVAL HOSPITAL COURSE / OVERNIGHT EVENTS:    Patient was examined and seen at bedside. This morning she is resting comfortably in bed and reports no new issues or overnight events.     Review of Systems: Otherwise unremarkable     <<<<<PAST MEDICAL & SURGICAL HISTORY>>>>>  DM (diabetes mellitus)    GI bleeding    S/P D&amp;C (status post dilation and curettage)    H/O Moh&#x27;s micrographic surgery for skin cancer  nose      ALLERGIES  cephalexin (Swelling)  Cipro (Hives)      Home Medications:  acetaminophen 325 mg oral tablet: 2 tab(s) orally every 6 hours, As needed, Mild Pain (03 Nov 2021 11:16)  atorvastatin 20 mg oral tablet: 1 tab(s) orally once a day (at bedtime) (03 Nov 2021 11:16)  levothyroxine 88 mcg (0.088 mg) oral tablet: 1 tab(s) orally once a day (03 Nov 2021 11:16)  metFORMIN 500 mg oral tablet: 1 tab(s) orally 2 times a day (03 Nov 2021 11:16)  pantoprazole 40 mg oral delayed release tablet: 1 tab(s) orally once a day (03 Nov 2021 11:16)        MEDICATIONS  STANDING MEDICATIONS  amLODIPine   Tablet 5 milliGRAM(s) Oral daily  atorvastatin 20 milliGRAM(s) Oral at bedtime  heparin SubCutaneous Injection - Peds 5000 Unit(s) SubCutaneous every 12 hours  levothyroxine 88 MICROGram(s) Oral daily  polyethylene glycol 3350 17 Gram(s) Oral daily  senna 2 Tablet(s) Oral at bedtime    PRN MEDICATIONS  acetaminophen     Tablet .. 650 milliGRAM(s) Oral every 6 hours PRN    VITALS:  T(F): 96.2  HR: 98  BP: 154/67  RR: 18  SpO2: 95%    <<<<<LABS>>>>>                        11.8   8.68  )-----------( 99       ( 05 Nov 2021 06:21 )             35.9     11-05    139  |  101  |  17  ----------------------------<  217<H>  4.6   |  22  |  1.1    Ca    8.6      05 Nov 2021 06:21  Mg     1.7     11-05    TPro  6.2  /  Alb  3.1<L>  /  TBili  0.4  /  DBili  x   /  AST  13  /  ALT  11  /  AlkPhos  93  11-05            [id number]      <<<<<RADIOLOGY>>>>>    < from: Xray Foot AP + Lateral + Oblique, Right (11.02.21 @ 18:29) >    EXAM:  XR FOOT COMP MIN 3 VIEWS RT            PROCEDURE DATE:  11/02/2021            INTERPRETATION:  Clinical History / Reason for exam: Foot injury.    Technique:  Three  radiographs of the right foot are obtained.    Comparison:  No studies are available for comparison.    Findings:  Acute, minimally displaced right distal fibular fracture..  Lisfranc joint is intact. Degenerative change of interphalangeal joints.    Impression:  Acute, minimally displaced right distal fibular fracture.    < end of copied text >      <<<<<PHYSICAL EXAM>>>>>  GENERAL: NAD, resting in bed comfortably  PULMONARY: Clear to auscultation bilaterally. No rales, rhonchi, or wheezing.  CARDIOVASCULAR: Regular rate and rhythm, S1-S2, no murmurs  GASTROINTESTINAL: Soft, non-tender, non-distended, no guarding.  SKIN/EXTREMITIES: right foot wrapped with ACE wrap  NEUROLOGIC: AAOX3      -----------------------------------------------------------------------------------------------------------------------------------------------------------------------------------------------
  YAIR PEARL  89y  [facility] F3-4B 033 A      Patient is a 89y old  Female who presents with a chief complaint of Fall (04 Nov 2021 11:14)      INTERVAL HPI/OVERNIGHT EVENTS:        REVIEW OF SYSTEMS:  CONSTITUTIONAL: No fever, weight loss, or fatigue  EYES: No eye pain, visual disturbances, or discharge  ENMT:  No difficulty hearing, tinnitus, vertigo; No sinus or throat pain  NECK: No pain or stiffness  BREASTS: No pain, masses, or nipple discharge  RESPIRATORY: No cough, wheezing, chills or hemoptysis; No shortness of breath  CARDIOVASCULAR: No chest pain, palpitations, dizziness, or leg swelling  GASTROINTESTINAL: No abdominal or epigastric pain. No nausea, vomiting, or hematemesis; No diarrhea or constipation. No melena or hematochezia.  GENITOURINARY: No dysuria, frequency, hematuria, or incontinence  NEUROLOGICAL: No headaches, memory loss, loss of strength, numbness, or tremors  SKIN: No itching, burning, rashes, or lesions   LYMPH NODES: No enlarged glands  ENDOCRINE: No heat or cold intolerance; No hair loss  MUSCULOSKELETAL: No joint pain or swelling; No muscle, back, or extremity pain  PSYCHIATRIC: No depression, anxiety, mood swings, or difficulty sleeping  HEME/LYMPH: No easy bruising, or bleeding gums  ALLERY AND IMMUNOLOGIC: No hives or eczema  FAMILY HISTORY:  Family history of colon cancer (Mother)    Family history of lung cancer (Father)      T(C): 35.8 (11-04-21 @ 07:26), Max: 36 (11-03-21 @ 16:12)  HR: 76 (11-04-21 @ 09:15) (67 - 85)  BP: 134/60 (11-04-21 @ 09:15) (134/60 - 200/81)  RR: 18 (11-04-21 @ 07:26) (18 - 20)  SpO2: 95% (11-04-21 @ 07:26) (95% - 95%)  Wt(kg): --Vital Signs Last 24 Hrs  T(C): 35.8 (04 Nov 2021 07:26), Max: 36 (03 Nov 2021 16:12)  T(F): 96.5 (04 Nov 2021 07:26), Max: 96.8 (03 Nov 2021 16:12)  HR: 76 (04 Nov 2021 09:15) (67 - 85)  BP: 134/60 (04 Nov 2021 09:15) (134/60 - 200/81)  BP(mean): --  RR: 18 (04 Nov 2021 07:26) (18 - 20)  SpO2: 95% (04 Nov 2021 07:26) (95% - 95%)    PHYSICAL EXAM:  GENERAL: NAD, well-groomed, well-developed  HEAD:  Atraumatic, Normocephalic  EYES: EOMI, PERRLA, conjunctiva and sclera clear  ENMT: No tonsillar erythema, exudates, or enlargement; Moist mucous membranes, Good dentition, No lesions  NECK: Supple, No JVD, Normal thyroid  NERVOUS SYSTEM:  Alert & Oriented X3, Good concentration; Motor Strength 5/5 B/L upper and lower extremities; DTRs 2+ intact and symmetric  PULM: Clear to auscultation bilaterally  CARDIAC: Regular rate and rhythm; No murmurs, rubs, or gallops  GI: Soft, Nontender, Nondistended; Bowel sounds present  EXTREMITIES:  2+ Peripheral Pulses, No clubbing, cyanosis, or edema  LYMPH: No lymphadenopathy noted  SKIN: No rashes or lesions    Consultant(s) Notes Reviewed:  [x ] YES  [ ] NO  Care Discussed with Consultants/Other Providers [ x] YES  [ ] NO    LABS:                            12.6   7.72  )-----------( 155      ( 04 Nov 2021 09:38 )             38.6   11-04    139  |  99  |  16  ----------------------------<  251<H>  4.3   |  26  |  1.0    Ca    8.8      04 Nov 2021 09:38    TPro  6.6  /  Alb  3.5  /  TBili  0.4  /  DBili  x   /  AST  13  /  ALT  12  /  AlkPhos  95  11-04            acetaminophen     Tablet .. 650 milliGRAM(s) Oral every 6 hours PRN  amLODIPine   Tablet 5 milliGRAM(s) Oral daily  atorvastatin 20 milliGRAM(s) Oral at bedtime  heparin SubCutaneous Injection - Peds 5000 Unit(s) SubCutaneous every 12 hours  levothyroxine 88 MICROGram(s) Oral daily      HEALTH ISSUES - PROBLEM Dx:          Case Discussed with House Staff   41 minutes spent on total encounter; more than 50% of the visit was spent counseling and/or coordinating care by the attending physician.   Hospitality Leaders [phone]  
  YAIR PEARL  89y  [facility] F3-4B 033 A      Patient is a 89y old  Female who presents with a chief complaint of Fall (06 Nov 2021 10:43)      INTERVAL HPI/OVERNIGHT EVENTS:  no acute events overnight       REVIEW OF SYSTEMS:  CONSTITUTIONAL: No fever, weight loss, or fatigue  EYES: No eye pain, visual disturbances, or discharge  ENMT:  No difficulty hearing, tinnitus, vertigo; No sinus or throat pain  NECK: No pain or stiffness  BREASTS: No pain, masses, or nipple discharge  RESPIRATORY: No cough, wheezing, chills or hemoptysis; No shortness of breath  CARDIOVASCULAR: No chest pain, palpitations, dizziness, or leg swelling  GASTROINTESTINAL: No abdominal or epigastric pain. No nausea, vomiting, or hematemesis; No diarrhea or constipation. No melena or hematochezia.  GENITOURINARY: No dysuria, frequency, hematuria, or incontinence  NEUROLOGICAL: No headaches, memory loss, loss of strength, numbness, or tremors  SKIN: No itching, burning, rashes, or lesions   LYMPH NODES: No enlarged glands  ENDOCRINE: No heat or cold intolerance; No hair loss  MUSCULOSKELETAL: No joint pain or swelling; No muscle, back, or extremity pain  PSYCHIATRIC: No depression, anxiety, mood swings, or difficulty sleeping  HEME/LYMPH: No easy bruising, or bleeding gums  ALLERY AND IMMUNOLOGIC: No hives or eczema  FAMILY HISTORY:  Family history of colon cancer (Mother)    Family history of lung cancer (Father)      T(C): 36.1 (11-06-21 @ 09:01), Max: 36.4 (11-05-21 @ 16:34)  HR: 90 (11-06-21 @ 09:01) (80 - 90)  BP: 140/70 (11-06-21 @ 09:30) (132/65 - 156/69)  RR: 18 (11-06-21 @ 09:01) (18 - 18)  SpO2: 95% (11-06-21 @ 00:00) (95% - 95%)  Wt(kg): --Vital Signs Last 24 Hrs  T(C): 36.1 (06 Nov 2021 09:01), Max: 36.4 (05 Nov 2021 16:34)  T(F): 96.9 (06 Nov 2021 09:01), Max: 97.6 (05 Nov 2021 16:34)  HR: 90 (06 Nov 2021 09:01) (80 - 90)  BP: 140/70 (06 Nov 2021 09:30) (132/65 - 156/69)  BP(mean): --  RR: 18 (06 Nov 2021 09:01) (18 - 18)  SpO2: 95% (06 Nov 2021 00:00) (95% - 95%)    PHYSICAL EXAM:  GENERAL: NAD, well-groomed, well-developed  HEAD:  Atraumatic, Normocephalic  EYES: EOMI, PERRLA, conjunctiva and sclera clear  ENMT: No tonsillar erythema, exudates, or enlargement; Moist mucous membranes, Good dentition, No lesions  NECK: Supple, No JVD, Normal thyroid  NERVOUS SYSTEM:  Alert & Oriented X3, Good concentration; Motor Strength 5/5 B/L upper and lower extremities; DTRs 2+ intact and symmetric  PULM: Clear to auscultation bilaterally  CARDIAC: Regular rate and rhythm; No murmurs, rubs, or gallops  GI: Soft, Nontender, Nondistended; Bowel sounds present  EXTREMITIES:  Right lower extremity dressed   LYMPH: No lymphadenopathy noted  SKIN: No rashes or lesions    Consultant(s) Notes Reviewed:  [x ] YES  [ ] NO  Care Discussed with Consultants/Other Providers [ x] YES  [ ] NO    LABS:                            12.3   7.32  )-----------( 152      ( 06 Nov 2021 09:00 )             37.6   11-06    136  |  99  |  16  ----------------------------<  291<H>  4.4   |  19  |  0.9    Ca    9.3      06 Nov 2021 09:00  Mg     2.1     11-06    TPro  7.0  /  Alb  3.6  /  TBili  0.5  /  DBili  x   /  AST  45<H>  /  ALT  31  /  AlkPhos  103  11-06            acetaminophen     Tablet .. 650 milliGRAM(s) Oral every 6 hours PRN  amLODIPine   Tablet 5 milliGRAM(s) Oral daily  atorvastatin 20 milliGRAM(s) Oral at bedtime  heparin SubCutaneous Injection - Peds 5000 Unit(s) SubCutaneous every 12 hours  levothyroxine 88 MICROGram(s) Oral daily  metFORMIN 500 milliGRAM(s) Oral two times a day  polyethylene glycol 3350 17 Gram(s) Oral daily  senna 2 Tablet(s) Oral at bedtime      HEALTH ISSUES - PROBLEM Dx:          Case Discussed with House Staff   Spectra [phone]  
----------Daily Progress Note----------    HISTORY OF PRESENT ILLNESS:  Patient is a 89y old Female who presents with a chief complaint of Fall (03 Nov 2021 13:47)    Currently admitted to medicine with the primary diagnosis of Fibula fracture       Today is hospital day 1d.     INTERVAL HOSPITAL COURSE / OVERNIGHT EVENTS:    Patient was examined and seen at bedside. This morning she is resting comfortably in bed and reports no new issues or overnight events.     Review of Systems: Otherwise unremarkable     <<<<<PAST MEDICAL & SURGICAL HISTORY>>>>>  DM (diabetes mellitus)    GI bleeding    S/P D&amp;C (status post dilation and curettage)    H/O Moh&#x27;s micrographic surgery for skin cancer  nose      ALLERGIES  cephalexin (Swelling)  Cipro (Hives)      Home Medications:  acetaminophen 325 mg oral tablet: 2 tab(s) orally every 6 hours, As needed, Mild Pain (03 Nov 2021 11:16)  atorvastatin 20 mg oral tablet: 1 tab(s) orally once a day (at bedtime) (03 Nov 2021 11:16)  levothyroxine 88 mcg (0.088 mg) oral tablet: 1 tab(s) orally once a day (03 Nov 2021 11:16)  metFORMIN 500 mg oral tablet: 1 tab(s) orally 2 times a day (03 Nov 2021 11:16)  pantoprazole 40 mg oral delayed release tablet: 1 tab(s) orally once a day (03 Nov 2021 11:16)        MEDICATIONS  STANDING MEDICATIONS  atorvastatin 20 milliGRAM(s) Oral at bedtime  heparin SubCutaneous Injection - Peds 5000 Unit(s) SubCutaneous every 12 hours  levothyroxine 88 MICROGram(s) Oral daily    PRN MEDICATIONS  acetaminophen     Tablet .. 650 milliGRAM(s) Oral every 6 hours PRN    VITALS:  T(F): 96.5  HR: 76  BP: 134/60  RR: 18  SpO2: 95%    <<<<<LABS>>>>>                        12.6   7.72  )-----------( 155      ( 04 Nov 2021 09:38 )             38.6     11-04    139  |  99  |  16  ----------------------------<  251<H>  4.3   |  26  |  1.0    Ca    8.8      04 Nov 2021 09:38    TPro  6.6  /  Alb  3.5  /  TBili  0.4  /  DBili  x   /  AST  13  /  ALT  12  /  AlkPhos  95  11-04            [id number]        <<<<<RADIOLOGY>>>>>    < from: Xray Foot AP + Lateral + Oblique, Right (11.02.21 @ 18:29) >    EXAM:  XR FOOT COMP MIN 3 VIEWS RT            PROCEDURE DATE:  11/02/2021            INTERPRETATION:  Clinical History / Reason for exam: Foot injury.    Technique:  Three  radiographs of the right foot are obtained.    Comparison:  No studies are available for comparison.    Findings:  Acute, minimally displaced right distal fibular fracture..  Lisfranc joint is intact. Degenerative change of interphalangeal joints.    Impression:  Acute, minimally displaced right distal fibular fracture.    < end of copied text >      <<<<<PHYSICAL EXAM>>>>>  GENERAL: NAD, resting in bed comfortably  PULMONARY: Clear to auscultation bilaterally. No rales, rhonchi, or wheezing.  CARDIOVASCULAR: Regular rate and rhythm, S1-S2, no murmurs  GASTROINTESTINAL: Soft, non-tender, non-distended, no guarding.  SKIN/EXTREMITIES: right foot wrapped with ACE wrap  NEUROLOGIC: AAOX3      -----------------------------------------------------------------------------------------------------------------------------------------------------------------------------------------------

## 2021-11-11 DIAGNOSIS — I16.0 HYPERTENSIVE URGENCY: ICD-10-CM

## 2021-11-11 DIAGNOSIS — E78.5 HYPERLIPIDEMIA, UNSPECIFIED: ICD-10-CM

## 2021-11-11 DIAGNOSIS — K21.9 GASTRO-ESOPHAGEAL REFLUX DISEASE WITHOUT ESOPHAGITIS: ICD-10-CM
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
## 2021-11-11 DIAGNOSIS — Z79.890 HORMONE REPLACEMENT THERAPY: ICD-10-CM

## 2021-11-11 DIAGNOSIS — S82.401A UNSPECIFIED FRACTURE OF SHAFT OF RIGHT FIBULA, INITIAL ENCOUNTER FOR CLOSED FRACTURE: ICD-10-CM

## 2021-11-11 DIAGNOSIS — E03.9 HYPOTHYROIDISM, UNSPECIFIED: ICD-10-CM

## 2021-11-11 DIAGNOSIS — R19.7 DIARRHEA, UNSPECIFIED: ICD-10-CM

## 2021-11-11 DIAGNOSIS — E11.9 TYPE 2 DIABETES MELLITUS WITHOUT COMPLICATIONS: ICD-10-CM
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
## 2021-11-11 DIAGNOSIS — E83.42 HYPOMAGNESEMIA: ICD-10-CM

## 2022-05-26 NOTE — ED ADULT NURSE NOTE - NS ED NOTE ABUSE SUSPICION NEGLECT YN
Lab Results   Component Value Date    HGBA1C 5 5 04/21/2022   · Bgs well controlled  · Continue Metformin  · Continue CCHO diet  · Continue Accu-cheks no

## 2023-04-09 NOTE — DISCHARGE NOTE PROVIDER - NSDCQMAMI_CARD_ALL_CORE
No Bed in lowest position, wheels locked, appropriate side rails in place/Call bell, personal items and telephone in reach/Instruct patient to call for assistance before getting out of bed or chair/Non-slip footwear when patient is out of bed/[place] to call system/Physically safe environment - no spills, clutter or unnecessary equipment/Purposeful Proactive Rounding/Room/bathroom lighting operational, light cord in reach

## 2024-04-12 NOTE — ED ADULT NURSE NOTE - EXTENSIONS OF SELF_ADULT
Erythematous appearing but pt states improved from prior? Chronic, possible infection though no systemic signs.     - ctm  - elevate legs as tolerated, encourage ambulation  - compression stockings, ace wrapping
None

## 2024-07-16 NOTE — ED ADULT TRIAGE NOTE - NS ED NURSE BANDS TYPE
Detail Level: Simple Quality 137: Melanoma: Continuity Of Care - Recall System: Patient information entered into a recall system that includes: target date for the next exam specified AND a process to follow up with patients regarding missed or unscheduled appointments Name band;

## 2024-09-24 NOTE — ASSESSMENT
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 with history of melena on ASA , past history of bleeding ulcers ? Iron deficiency anemia responded to therapy , Hb is 12.1 . Detail Level: Detailed

## 2024-09-26 NOTE — DISCHARGE NOTE NURSING/CASE MANAGEMENT/SOCIAL WORK - NSDCPETBCESMAN_GEN_ALL_CORE
No
If you are a smoker, it is important for your health to stop smoking. Please be aware that second hand smoke is also harmful.
If you are a smoker, it is important for your health to stop smoking. Please be aware that second hand smoke is also harmful.